# Patient Record
Sex: MALE | Race: BLACK OR AFRICAN AMERICAN | NOT HISPANIC OR LATINO | Employment: PART TIME | URBAN - METROPOLITAN AREA
[De-identification: names, ages, dates, MRNs, and addresses within clinical notes are randomized per-mention and may not be internally consistent; named-entity substitution may affect disease eponyms.]

---

## 2019-10-28 ENCOUNTER — OFFICE VISIT - HEALTHEAST (OUTPATIENT)
Dept: FAMILY MEDICINE | Facility: CLINIC | Age: 44
End: 2019-10-28

## 2019-10-28 DIAGNOSIS — E11.9 TYPE 2 DIABETES MELLITUS WITHOUT COMPLICATION, WITH LONG-TERM CURRENT USE OF INSULIN (H): ICD-10-CM

## 2019-10-28 DIAGNOSIS — Z79.4 TYPE 2 DIABETES MELLITUS WITHOUT COMPLICATION, WITH LONG-TERM CURRENT USE OF INSULIN (H): ICD-10-CM

## 2019-10-28 ASSESSMENT — MIFFLIN-ST. JEOR: SCORE: 1864.99

## 2019-10-31 ENCOUNTER — COMMUNICATION - HEALTHEAST (OUTPATIENT)
Dept: FAMILY MEDICINE | Facility: CLINIC | Age: 44
End: 2019-10-31

## 2020-01-20 ENCOUNTER — COMMUNICATION - HEALTHEAST (OUTPATIENT)
Dept: FAMILY MEDICINE | Facility: CLINIC | Age: 45
End: 2020-01-20

## 2020-01-20 ENCOUNTER — OFFICE VISIT - HEALTHEAST (OUTPATIENT)
Dept: FAMILY MEDICINE | Facility: CLINIC | Age: 45
End: 2020-01-20

## 2020-01-20 DIAGNOSIS — E11.9 TYPE 2 DIABETES MELLITUS WITHOUT COMPLICATION, WITH LONG-TERM CURRENT USE OF INSULIN (H): ICD-10-CM

## 2020-01-20 DIAGNOSIS — Z23 NEED FOR TDAP VACCINATION: ICD-10-CM

## 2020-01-20 DIAGNOSIS — Z23 NEED FOR INFLUENZA VACCINATION: ICD-10-CM

## 2020-01-20 DIAGNOSIS — Z79.4 TYPE 2 DIABETES MELLITUS WITHOUT COMPLICATION, WITH LONG-TERM CURRENT USE OF INSULIN (H): ICD-10-CM

## 2020-01-20 LAB
ANION GAP SERPL CALCULATED.3IONS-SCNC: 10 MMOL/L (ref 5–18)
BUN SERPL-MCNC: 11 MG/DL (ref 8–22)
CALCIUM SERPL-MCNC: 9.5 MG/DL (ref 8.5–10.5)
CHLORIDE BLD-SCNC: 103 MMOL/L (ref 98–107)
CHOLEST SERPL-MCNC: 74 MG/DL
CO2 SERPL-SCNC: 26 MMOL/L (ref 22–31)
CREAT SERPL-MCNC: 1.05 MG/DL (ref 0.7–1.3)
CREAT UR-MCNC: 93.5 MG/DL
FASTING STATUS PATIENT QL REPORTED: NO
GFR SERPL CREATININE-BSD FRML MDRD: >60 ML/MIN/1.73M2
GLUCOSE BLD-MCNC: 333 MG/DL (ref 70–125)
HBA1C MFR BLD: 10.3 % (ref 3.5–6)
HDLC SERPL-MCNC: 35 MG/DL
LDLC SERPL CALC-MCNC: 23 MG/DL
MICROALBUMIN UR-MCNC: 1.7 MG/DL (ref 0–1.99)
MICROALBUMIN/CREAT UR: 18.2 MG/G
POTASSIUM BLD-SCNC: 4.2 MMOL/L (ref 3.5–5)
SODIUM SERPL-SCNC: 139 MMOL/L (ref 136–145)
TRIGL SERPL-MCNC: 82 MG/DL

## 2020-05-11 ENCOUNTER — OFFICE VISIT - HEALTHEAST (OUTPATIENT)
Dept: FAMILY MEDICINE | Facility: CLINIC | Age: 45
End: 2020-05-11

## 2020-05-11 DIAGNOSIS — Z79.4 TYPE 2 DIABETES MELLITUS WITHOUT COMPLICATION, WITH LONG-TERM CURRENT USE OF INSULIN (H): ICD-10-CM

## 2020-05-11 DIAGNOSIS — E11.9 TYPE 2 DIABETES MELLITUS WITHOUT COMPLICATION, WITH LONG-TERM CURRENT USE OF INSULIN (H): ICD-10-CM

## 2020-05-18 ENCOUNTER — AMBULATORY - HEALTHEAST (OUTPATIENT)
Dept: LAB | Facility: CLINIC | Age: 45
End: 2020-05-18

## 2020-05-18 DIAGNOSIS — E11.9 TYPE 2 DIABETES MELLITUS WITHOUT COMPLICATION, WITH LONG-TERM CURRENT USE OF INSULIN (H): ICD-10-CM

## 2020-05-18 DIAGNOSIS — Z79.4 TYPE 2 DIABETES MELLITUS WITHOUT COMPLICATION, WITH LONG-TERM CURRENT USE OF INSULIN (H): ICD-10-CM

## 2020-05-18 LAB — HBA1C MFR BLD: 10.3 % (ref 3.5–6)

## 2020-05-27 ENCOUNTER — COMMUNICATION - HEALTHEAST (OUTPATIENT)
Dept: FAMILY MEDICINE | Facility: CLINIC | Age: 45
End: 2020-05-27

## 2020-05-27 DIAGNOSIS — E11.9 TYPE 2 DIABETES MELLITUS WITHOUT COMPLICATION, WITH LONG-TERM CURRENT USE OF INSULIN (H): ICD-10-CM

## 2020-05-27 DIAGNOSIS — Z79.4 TYPE 2 DIABETES MELLITUS WITHOUT COMPLICATION, WITH LONG-TERM CURRENT USE OF INSULIN (H): ICD-10-CM

## 2020-06-01 ENCOUNTER — RECORDS - HEALTHEAST (OUTPATIENT)
Dept: ADMINISTRATIVE | Facility: OTHER | Age: 45
End: 2020-06-01

## 2020-06-11 ENCOUNTER — AMBULATORY - HEALTHEAST (OUTPATIENT)
Dept: FAMILY MEDICINE | Facility: CLINIC | Age: 45
End: 2020-06-11

## 2020-06-11 DIAGNOSIS — Z79.4 TYPE 2 DIABETES MELLITUS WITHOUT COMPLICATION, WITH LONG-TERM CURRENT USE OF INSULIN (H): ICD-10-CM

## 2020-06-11 DIAGNOSIS — E11.9 TYPE 2 DIABETES MELLITUS WITHOUT COMPLICATION, WITH LONG-TERM CURRENT USE OF INSULIN (H): ICD-10-CM

## 2020-06-22 ENCOUNTER — HOSPITAL ENCOUNTER (OUTPATIENT)
Dept: PHARMACY | Facility: OTHER | Age: 45
Discharge: HOME | End: 2020-06-22
Attending: PHARMACIST

## 2020-06-22 DIAGNOSIS — E11.65 TYPE 2 DIABETES MELLITUS WITH HYPERGLYCEMIA, WITH LONG-TERM CURRENT USE OF INSULIN (H): ICD-10-CM

## 2020-06-22 DIAGNOSIS — E11.9 TYPE 2 DIABETES MELLITUS WITHOUT COMPLICATION, WITH LONG-TERM CURRENT USE OF INSULIN (H): ICD-10-CM

## 2020-06-22 DIAGNOSIS — I10 ESSENTIAL HYPERTENSION: ICD-10-CM

## 2020-06-22 DIAGNOSIS — Z79.4 TYPE 2 DIABETES MELLITUS WITH HYPERGLYCEMIA, WITH LONG-TERM CURRENT USE OF INSULIN (H): ICD-10-CM

## 2020-06-22 DIAGNOSIS — M25.519 ARTHRALGIA OF SHOULDER, UNSPECIFIED LATERALITY: ICD-10-CM

## 2020-06-22 DIAGNOSIS — Z79.4 TYPE 2 DIABETES MELLITUS WITHOUT COMPLICATION, WITH LONG-TERM CURRENT USE OF INSULIN (H): ICD-10-CM

## 2020-06-22 PROCEDURE — 99607 MTMS BY PHARM ADDL 15 MIN: CPT | Performed by: PHARMACIST

## 2020-06-22 PROCEDURE — 99605 MTMS BY PHARM NP 15 MIN: CPT | Performed by: PHARMACIST

## 2020-06-22 RX ORDER — IBUPROFEN 200 MG
200 TABLET ORAL EVERY 6 HOURS PRN
Status: SHIPPED | COMMUNITY
Start: 2020-06-22 | End: 2021-09-24

## 2020-06-22 RX ORDER — FLASH GLUCOSE SENSOR
1 KIT MISCELLANEOUS
Qty: 2 KIT | Refills: 11 | Status: SHIPPED | OUTPATIENT
Start: 2020-06-22 | End: 2021-09-24

## 2020-06-29 ENCOUNTER — HOSPITAL ENCOUNTER (OUTPATIENT)
Dept: PHARMACY | Facility: OTHER | Age: 45
Discharge: HOME | End: 2020-06-29
Attending: PHARMACIST

## 2020-06-29 DIAGNOSIS — Z79.4 TYPE 2 DIABETES MELLITUS WITH HYPERGLYCEMIA, WITH LONG-TERM CURRENT USE OF INSULIN (H): ICD-10-CM

## 2020-06-29 DIAGNOSIS — E11.65 TYPE 2 DIABETES MELLITUS WITH HYPERGLYCEMIA, WITH LONG-TERM CURRENT USE OF INSULIN (H): ICD-10-CM

## 2020-07-06 ENCOUNTER — HOSPITAL ENCOUNTER (OUTPATIENT)
Dept: PHARMACY | Facility: OTHER | Age: 45
Discharge: HOME | End: 2020-07-06
Attending: PHARMACIST

## 2020-07-06 DIAGNOSIS — Z79.4 TYPE 2 DIABETES MELLITUS WITH HYPERGLYCEMIA, WITH LONG-TERM CURRENT USE OF INSULIN (H): ICD-10-CM

## 2020-07-06 DIAGNOSIS — E11.65 TYPE 2 DIABETES MELLITUS WITH HYPERGLYCEMIA, WITH LONG-TERM CURRENT USE OF INSULIN (H): ICD-10-CM

## 2020-07-15 ENCOUNTER — COMMUNICATION - HEALTHEAST (OUTPATIENT)
Dept: FAMILY MEDICINE | Facility: CLINIC | Age: 45
End: 2020-07-15

## 2020-07-15 DIAGNOSIS — E11.65 TYPE 2 DIABETES MELLITUS WITH HYPERGLYCEMIA, WITH LONG-TERM CURRENT USE OF INSULIN (H): ICD-10-CM

## 2020-07-15 DIAGNOSIS — Z79.4 TYPE 2 DIABETES MELLITUS WITH HYPERGLYCEMIA, WITH LONG-TERM CURRENT USE OF INSULIN (H): ICD-10-CM

## 2020-07-20 ENCOUNTER — HOSPITAL ENCOUNTER (OUTPATIENT)
Dept: PHARMACY | Facility: OTHER | Age: 45
Discharge: HOME | End: 2020-07-20
Attending: PHARMACIST

## 2020-07-20 DIAGNOSIS — E11.65 TYPE 2 DIABETES MELLITUS WITH HYPERGLYCEMIA, WITH LONG-TERM CURRENT USE OF INSULIN (H): ICD-10-CM

## 2020-07-20 DIAGNOSIS — Z79.4 TYPE 2 DIABETES MELLITUS WITH HYPERGLYCEMIA, WITH LONG-TERM CURRENT USE OF INSULIN (H): ICD-10-CM

## 2020-07-20 DIAGNOSIS — M25.519 ARTHRALGIA OF SHOULDER, UNSPECIFIED LATERALITY: ICD-10-CM

## 2020-07-20 DIAGNOSIS — I10 ESSENTIAL HYPERTENSION: ICD-10-CM

## 2020-07-20 PROCEDURE — 99607 MTMS BY PHARM ADDL 15 MIN: CPT | Performed by: PHARMACIST

## 2020-07-20 PROCEDURE — 99606 MTMS BY PHARM EST 15 MIN: CPT | Performed by: PHARMACIST

## 2020-08-04 ENCOUNTER — COMMUNICATION - HEALTHEAST (OUTPATIENT)
Dept: FAMILY MEDICINE | Facility: CLINIC | Age: 45
End: 2020-08-04

## 2020-08-04 DIAGNOSIS — E11.9 TYPE 2 DIABETES MELLITUS WITHOUT COMPLICATION, WITH LONG-TERM CURRENT USE OF INSULIN (H): ICD-10-CM

## 2020-08-04 DIAGNOSIS — Z79.4 TYPE 2 DIABETES MELLITUS WITHOUT COMPLICATION, WITH LONG-TERM CURRENT USE OF INSULIN (H): ICD-10-CM

## 2020-08-14 ENCOUNTER — COMMUNICATION - HEALTHEAST (OUTPATIENT)
Dept: FAMILY MEDICINE | Facility: CLINIC | Age: 45
End: 2020-08-14

## 2020-08-14 DIAGNOSIS — Z79.4 TYPE 2 DIABETES MELLITUS WITH HYPERGLYCEMIA, WITH LONG-TERM CURRENT USE OF INSULIN (H): ICD-10-CM

## 2020-08-14 DIAGNOSIS — E11.65 TYPE 2 DIABETES MELLITUS WITH HYPERGLYCEMIA, WITH LONG-TERM CURRENT USE OF INSULIN (H): ICD-10-CM

## 2020-08-14 RX ORDER — FLASH GLUCOSE SCANNING READER
EACH MISCELLANEOUS
Qty: 1 EACH | Refills: 0 | Status: SHIPPED | OUTPATIENT
Start: 2020-08-14 | End: 2021-09-24

## 2020-08-17 ENCOUNTER — HOSPITAL ENCOUNTER (OUTPATIENT)
Dept: PHARMACY | Facility: OTHER | Age: 45
Discharge: HOME | End: 2020-08-17
Attending: PHARMACIST

## 2020-08-17 DIAGNOSIS — E11.65 TYPE 2 DIABETES MELLITUS WITH HYPERGLYCEMIA, WITH LONG-TERM CURRENT USE OF INSULIN (H): ICD-10-CM

## 2020-08-17 DIAGNOSIS — Z79.4 TYPE 2 DIABETES MELLITUS WITH HYPERGLYCEMIA, WITH LONG-TERM CURRENT USE OF INSULIN (H): ICD-10-CM

## 2020-08-19 ENCOUNTER — OFFICE VISIT - HEALTHEAST (OUTPATIENT)
Dept: PHARMACY | Facility: CLINIC | Age: 45
End: 2020-08-19

## 2020-08-19 ENCOUNTER — COMMUNICATION - HEALTHEAST (OUTPATIENT)
Dept: FAMILY MEDICINE | Facility: CLINIC | Age: 45
End: 2020-08-19

## 2020-08-19 DIAGNOSIS — Z79.4 TYPE 2 DIABETES MELLITUS WITHOUT COMPLICATION, WITH LONG-TERM CURRENT USE OF INSULIN (H): ICD-10-CM

## 2020-08-19 DIAGNOSIS — E11.9 TYPE 2 DIABETES MELLITUS WITHOUT COMPLICATION, WITH LONG-TERM CURRENT USE OF INSULIN (H): ICD-10-CM

## 2020-08-19 DIAGNOSIS — Z79.4 TYPE 2 DIABETES MELLITUS WITH HYPERGLYCEMIA, WITH LONG-TERM CURRENT USE OF INSULIN (H): ICD-10-CM

## 2020-08-19 DIAGNOSIS — E11.65 TYPE 2 DIABETES MELLITUS WITH HYPERGLYCEMIA, WITH LONG-TERM CURRENT USE OF INSULIN (H): ICD-10-CM

## 2020-09-02 ENCOUNTER — AMBULATORY - HEALTHEAST (OUTPATIENT)
Dept: PHARMACY | Facility: CLINIC | Age: 45
End: 2020-09-02

## 2020-09-02 ENCOUNTER — COMMUNICATION - HEALTHEAST (OUTPATIENT)
Dept: FAMILY MEDICINE | Facility: CLINIC | Age: 45
End: 2020-09-02

## 2020-09-02 DIAGNOSIS — Z79.4 TYPE 2 DIABETES MELLITUS WITH HYPERGLYCEMIA, WITH LONG-TERM CURRENT USE OF INSULIN (H): ICD-10-CM

## 2020-09-02 DIAGNOSIS — E11.65 TYPE 2 DIABETES MELLITUS WITH HYPERGLYCEMIA, WITH LONG-TERM CURRENT USE OF INSULIN (H): ICD-10-CM

## 2020-09-02 PROCEDURE — 99606 MTMS BY PHARM EST 15 MIN: CPT | Performed by: PHARMACIST

## 2020-09-02 PROCEDURE — 99607 MTMS BY PHARM ADDL 15 MIN: CPT | Performed by: PHARMACIST

## 2020-09-02 RX ORDER — EXENATIDE 2 MG/.65ML
2 INJECTION, SUSPENSION, EXTENDED RELEASE SUBCUTANEOUS
Qty: 4 EACH | Refills: 3 | Status: SHIPPED | OUTPATIENT
Start: 2020-09-02 | End: 2021-09-24

## 2020-09-08 ENCOUNTER — COMMUNICATION - HEALTHEAST (OUTPATIENT)
Dept: FAMILY MEDICINE | Facility: CLINIC | Age: 45
End: 2020-09-08

## 2020-09-08 DIAGNOSIS — E11.9 TYPE 2 DIABETES MELLITUS WITHOUT COMPLICATION, WITH LONG-TERM CURRENT USE OF INSULIN (H): ICD-10-CM

## 2020-09-08 DIAGNOSIS — Z79.4 TYPE 2 DIABETES MELLITUS WITHOUT COMPLICATION, WITH LONG-TERM CURRENT USE OF INSULIN (H): ICD-10-CM

## 2020-09-14 ENCOUNTER — COMMUNICATION - HEALTHEAST (OUTPATIENT)
Dept: FAMILY MEDICINE | Facility: CLINIC | Age: 45
End: 2020-09-14

## 2020-09-14 DIAGNOSIS — E11.9 TYPE 2 DIABETES MELLITUS WITHOUT COMPLICATION, WITH LONG-TERM CURRENT USE OF INSULIN (H): ICD-10-CM

## 2020-09-14 DIAGNOSIS — Z79.4 TYPE 2 DIABETES MELLITUS WITHOUT COMPLICATION, WITH LONG-TERM CURRENT USE OF INSULIN (H): ICD-10-CM

## 2020-09-15 ENCOUNTER — COMMUNICATION - HEALTHEAST (OUTPATIENT)
Dept: FAMILY MEDICINE | Facility: CLINIC | Age: 45
End: 2020-09-15

## 2020-10-21 ENCOUNTER — COMMUNICATION - HEALTHEAST (OUTPATIENT)
Dept: FAMILY MEDICINE | Facility: CLINIC | Age: 45
End: 2020-10-21

## 2020-10-21 DIAGNOSIS — E11.9 TYPE 2 DIABETES MELLITUS WITHOUT COMPLICATION, WITH LONG-TERM CURRENT USE OF INSULIN (H): ICD-10-CM

## 2020-10-21 DIAGNOSIS — Z79.4 TYPE 2 DIABETES MELLITUS WITHOUT COMPLICATION, WITH LONG-TERM CURRENT USE OF INSULIN (H): ICD-10-CM

## 2020-11-12 ENCOUNTER — COMMUNICATION - HEALTHEAST (OUTPATIENT)
Dept: FAMILY MEDICINE | Facility: CLINIC | Age: 45
End: 2020-11-12

## 2020-11-12 DIAGNOSIS — Z79.4 TYPE 2 DIABETES MELLITUS WITHOUT COMPLICATION, WITH LONG-TERM CURRENT USE OF INSULIN (H): ICD-10-CM

## 2020-11-12 DIAGNOSIS — E11.9 TYPE 2 DIABETES MELLITUS WITHOUT COMPLICATION, WITH LONG-TERM CURRENT USE OF INSULIN (H): ICD-10-CM

## 2021-01-15 ENCOUNTER — COMMUNICATION - HEALTHEAST (OUTPATIENT)
Dept: FAMILY MEDICINE | Facility: CLINIC | Age: 46
End: 2021-01-15

## 2021-05-12 ENCOUNTER — OFFICE VISIT - HEALTHEAST (OUTPATIENT)
Dept: FAMILY MEDICINE | Facility: CLINIC | Age: 46
End: 2021-05-12

## 2021-05-12 DIAGNOSIS — E11.9 TYPE 2 DIABETES MELLITUS WITHOUT COMPLICATION, WITH LONG-TERM CURRENT USE OF INSULIN (H): ICD-10-CM

## 2021-05-12 DIAGNOSIS — Z79.4 TYPE 2 DIABETES MELLITUS WITHOUT COMPLICATION, WITH LONG-TERM CURRENT USE OF INSULIN (H): ICD-10-CM

## 2021-05-12 DIAGNOSIS — Z79.4 TYPE 2 DIABETES MELLITUS WITH HYPERGLYCEMIA, WITH LONG-TERM CURRENT USE OF INSULIN (H): ICD-10-CM

## 2021-05-12 DIAGNOSIS — E11.65 TYPE 2 DIABETES MELLITUS WITH HYPERGLYCEMIA, WITH LONG-TERM CURRENT USE OF INSULIN (H): ICD-10-CM

## 2021-05-12 LAB
ANION GAP SERPL CALCULATED.3IONS-SCNC: 13 MMOL/L (ref 5–18)
BUN SERPL-MCNC: 17 MG/DL (ref 8–22)
CALCIUM SERPL-MCNC: 8.9 MG/DL (ref 8.5–10.5)
CHLORIDE BLD-SCNC: 104 MMOL/L (ref 98–107)
CHOLEST SERPL-MCNC: 93 MG/DL
CO2 SERPL-SCNC: 22 MMOL/L (ref 22–31)
CREAT SERPL-MCNC: 1.02 MG/DL (ref 0.7–1.3)
CREAT UR-MCNC: 144.6 MG/DL
FASTING STATUS PATIENT QL REPORTED: ABNORMAL
GFR SERPL CREATININE-BSD FRML MDRD: >60 ML/MIN/1.73M2
GLUCOSE BLD-MCNC: 367 MG/DL (ref 70–125)
HBA1C MFR BLD: 8.8 %
HDLC SERPL-MCNC: 36 MG/DL
LDLC SERPL CALC-MCNC: 29 MG/DL
MICROALBUMIN UR-MCNC: 1.39 MG/DL (ref 0–1.99)
MICROALBUMIN/CREAT UR: 9.6 MG/G
POTASSIUM BLD-SCNC: 4 MMOL/L (ref 3.5–5)
SODIUM SERPL-SCNC: 139 MMOL/L (ref 136–145)
TRIGL SERPL-MCNC: 139 MG/DL

## 2021-05-12 RX ORDER — ATORVASTATIN CALCIUM 40 MG/1
40 TABLET, FILM COATED ORAL DAILY
Qty: 90 TABLET | Refills: 3 | Status: SHIPPED | OUTPATIENT
Start: 2021-05-12 | End: 2021-09-24

## 2021-05-12 RX ORDER — LISINOPRIL 5 MG/1
5 TABLET ORAL DAILY
Qty: 90 TABLET | Refills: 1 | Status: SHIPPED | OUTPATIENT
Start: 2021-05-12 | End: 2021-09-24

## 2021-05-12 ASSESSMENT — MIFFLIN-ST. JEOR: SCORE: 1884.83

## 2021-05-13 LAB — HCV AB SERPL QL IA: NEGATIVE

## 2021-05-17 ENCOUNTER — RECORDS - HEALTHEAST (OUTPATIENT)
Dept: ADMINISTRATIVE | Facility: OTHER | Age: 46
End: 2021-05-17

## 2021-05-17 LAB — RETINOPATHY: NEGATIVE

## 2021-05-18 ENCOUNTER — COMMUNICATION - HEALTHEAST (OUTPATIENT)
Dept: FAMILY MEDICINE | Facility: CLINIC | Age: 46
End: 2021-05-18

## 2021-05-18 DIAGNOSIS — Z79.4 TYPE 2 DIABETES MELLITUS WITHOUT COMPLICATION, WITH LONG-TERM CURRENT USE OF INSULIN (H): ICD-10-CM

## 2021-05-18 DIAGNOSIS — E11.65 TYPE 2 DIABETES MELLITUS WITH HYPERGLYCEMIA, WITH LONG-TERM CURRENT USE OF INSULIN (H): ICD-10-CM

## 2021-05-18 DIAGNOSIS — Z79.4 TYPE 2 DIABETES MELLITUS WITH HYPERGLYCEMIA, WITH LONG-TERM CURRENT USE OF INSULIN (H): ICD-10-CM

## 2021-05-18 DIAGNOSIS — E11.9 TYPE 2 DIABETES MELLITUS WITHOUT COMPLICATION, WITH LONG-TERM CURRENT USE OF INSULIN (H): ICD-10-CM

## 2021-05-21 ENCOUNTER — RECORDS - HEALTHEAST (OUTPATIENT)
Dept: HEALTH INFORMATION MANAGEMENT | Facility: CLINIC | Age: 46
End: 2021-05-21

## 2021-05-27 VITALS
DIASTOLIC BLOOD PRESSURE: 78 MMHG | SYSTOLIC BLOOD PRESSURE: 118 MMHG | HEART RATE: 101 BPM | HEIGHT: 72 IN | WEIGHT: 216 LBS | BODY MASS INDEX: 29.26 KG/M2

## 2021-06-02 NOTE — TELEPHONE ENCOUNTER
I spoke with Valentin on October 31, 2019, he is requesting to use the Pharmacy Assistance Fund $500.00..    Unfortunately Valentin is not eligible for this funding, he is not an established patient with [facility], and does not utilize a [facility] on a regular basis.    Amie Alston  [facility]   Pharmacy Assistance  [phone]

## 2021-06-02 NOTE — PROGRESS NOTES
"Subjective:  44 y.o. male with concerns of follow up on DM-II  He has lived in [place] and gone back and forth.  Cannot find work in [place] so came back even though he doesn't like the winter here.    [facility] 10/18/19.  Notes reviewed.  Glucose 341.  They have him Humalog 75/25 to add to the basal insulin he had already (Lantuss--he was using 50, they cut that to 20).  I think they were meaning that he should get plain Humalog.    He thinks sugars are 300+.  Denies excessive thirst or urination.    Outpatient Medications Prior to Visit   Medication Sig Dispense Refill     BD ULTRA-FINE TRISH PEN NEEDLES 32 gauge x 5/32\" Ndle USE TO INJECT NOVOLOG INSULIN USING FLEX PEN AND LANTUS SOLOSTAR 100 each 0     MICROLET LANCET Misc        CONTOUR NEXT STRIPS strips USE TO CHECK BLOOD GLUCOSE FOUR TIMES DAILY. BEFORE EACH MEAL AND AT BEDTIME 125 strip 0     insulin glargine (LANTUS) 100 unit/mL vial Inject 20 Units under the skin. Inject 20 Units under the skin.       insulin lispro protamine-insulin lispro (HUMALOG MIX 75-25,U-100,INSULN) 100 unit/mL (75-25) Susp Inject 10 Units under the skin. Inject 10 Units under the skin sliding scale.       blood sugar diagnostic (ONE TOUCH ULTRA TEST) Strp Test 4 times daily 360 strip 3     blood-glucose meter (ONE TOUCH ULTRA SYSTEM KIT) kit Use as instructed 1 each 0     LANTUS SOLOSTAR 100 unit/mL (3 mL) pen INJECT 30 UNITS UNDER THE SKIN DAILY AT BEDTIME 15 mL 0     NOVOLOG FLEXPEN 100 unit/mL injection pen INJECT 7 UNITS UNDER THE SKIN THREE TIMES DAILY BEFORE MEALS 15 mL 0     NOVOLOG FLEXPEN 100 unit/mL injection pen INJECT 7 UNITS UNDER THE SKIN THREE TIMES DAILY BEFORE MEALS 15 mL 0     No facility-administered medications prior to visit.       Social History     Tobacco Use   Smoking Status Never Smoker   Smokeless Tobacco Never Used      Objective:  /80 (Patient Site: Right Arm, Patient Position: Sitting, Cuff Size: Adult Regular)   Pulse 100   Ht 5' 10.25\" (1.784 m)   Wt " 216 lb (98 kg)   BMI 30.77 kg/m    GENERAL: alert, not distressed  CHEST: clear, no rales, rhonchi, or wheezes  CARDIAC: regular without murmur, gallop, or rub  ABDOMEN: soft, non tender, non distended, normal bowel sounds    Assessment and Plan:     1. Type 2 diabetes mellitus without complication, with long-term current use of insulin (H)    No insurance now.  Gave card for [employer] assistance and encouraged him to call.    Printed this scale for use with [employer] Novolog.  New Rx sent.    Sliding scale insulin:  Start with 10 units with meals.    For blood glucose  Add  Less than 150   0  150-200   2  210-250   4  251-300   6  301-350   8  351-400   10  401-450   13   Greater than 450  16    Prescription for test strips, Novolog, lisinopril, ASA, atorvastatin.  Advised not to use vial of 75/25.    Follow up about 6-8 weeks or when insurance is active.

## 2021-06-02 NOTE — PATIENT INSTRUCTIONS - HE
Sliding scale insulin:  Start with 10 units with meals.    For blood glucose  Add  Less than 150   0  150-200   2  210-250   4  251-300   6  301-350   8  351-400   10  401-450   13   Greater than 450  16

## 2021-06-03 VITALS
SYSTOLIC BLOOD PRESSURE: 116 MMHG | DIASTOLIC BLOOD PRESSURE: 80 MMHG | WEIGHT: 216 LBS | BODY MASS INDEX: 30.92 KG/M2 | HEART RATE: 100 BPM | HEIGHT: 70 IN

## 2021-06-04 VITALS
HEART RATE: 80 BPM | WEIGHT: 214 LBS | BODY MASS INDEX: 30.49 KG/M2 | DIASTOLIC BLOOD PRESSURE: 78 MMHG | SYSTOLIC BLOOD PRESSURE: 108 MMHG

## 2021-06-05 NOTE — PROGRESS NOTES
"Subjective:  44 y.o. male with concerns of follow-up on diabetes.  Think sugars have probably been high.  Getting 150-210 fasting.  Numbers in 300s after meals.  Using 30 units of Lantus nightly and 10 units plus a correction at mealtimes.  Does note increased thirst and urination if sugar is quite high.    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  Not much for exercise.    Outpatient Medications Prior to Visit   Medication Sig Dispense Refill     aspirin 81 MG EC tablet Take 1 tablet (81 mg total) by mouth daily. 150 tablet 2     atorvastatin (LIPITOR) 40 MG tablet Take 1 tablet (40 mg total) by mouth daily. 90 tablet 3     blood glucose test strips Use one test strip with your Next meter to check blood sugar 3 time(s) daily 300 strip 3     lisinopril (PRINIVIL,ZESTRIL) 5 MG tablet Take 1 tablet (5 mg total) by mouth daily. 90 tablet 1     MICROLET LANCET Misc        BD ULTRA-FINE TRISH PEN NEEDLES 32 gauge x 5/32\" Ndle USE TO INJECT NOVOLOG INSULIN USING FLEX PEN AND LANTUS SOLOSTAR 100 each 0     insulin aspart U-100 (NOVOLOG FLEXPEN U-100 INSULIN) 100 unit/mL (3 mL) injection pen Inject 10 Units under the skin 3 (three) times a day before meals. 15 mL 4     insulin glargine (LANTUS SOLOSTAR U-100 INSULIN) 100 unit/mL (3 mL) pen Inject 30 Units under the skin at bedtime. 15 mL 0     No facility-administered medications prior to visit.       Social History     Tobacco Use   Smoking Status Never Smoker   Smokeless Tobacco Never Used      Objective:  /78 (Patient Site: Right Arm, Patient Position: Sitting, Cuff Size: Adult Regular)   Pulse 80   Wt 214 lb (97.1 kg) Comment: with shoes/jacket  BMI 30.49 kg/m    GENERAL: alert, not distressed  CHEST: clear, no rales, rhonchi, or wheezes  CARDIAC: regular without murmur, gallop, or rub  ABDOMEN: soft, non tender, non distended, normal bowel sounds    FOOT EXAM:  DP and PT pulses are normal.  Monofilament testing normal  Nails " "normal.  Hair growth absent.    Recent Results (from the past 24 hour(s))   Glycosylated Hemoglobin A1c   Result Value Ref Range    Hemoglobin A1c 10.3 (H) 3.5 - 6.0 %      Assessment and Plan:   1. Type 2 diabetes mellitus without complication, with long-term current use of insulin (H)  Not controlled.  BP is ok.  Needs better control   Increase to 40 lantus  Increase to 15 novolog with meals plus correction.  Discussed hypoglycemia symptoms and treatment  - Lipid Cascade FASTING  - Glycosylated Hemoglobin A1c  - Basic Metabolic Panel  - Microalbumin, Random Urine  - Ambulatory referral to Ophthalmology  -  DIABETES FOOT EXAM  - insulin glargine (LANTUS SOLOSTAR U-100 INSULIN) 100 unit/mL (3 mL) pen; Inject 40 Units under the skin at bedtime.  Dispense: 5 adj dose pen; Refill: 5  - insulin aspart U-100 (NOVOLOG FLEXPEN U-100 INSULIN) 100 unit/mL (3 mL) injection pen; Inject 15 Units under the skin 3 (three) times a day before meals.  Dispense: 5 Pre-filled Pen Syringe; Refill: 5  - pen needle, diabetic (BD ULTRA-FINE TRISH PEN NEEDLE) 32 gauge x 5/32\" Ndle; Use with lantus and novolog insulin.  Dispense: 100 each; Refill: 5    2. Need for Tdap vaccination  - Tdap vaccine,  8yo or older,  IM    3. Need for influenza vaccination  - Influenza, Seasonal Quad, PF =/> 6months     No follow-ups on file.   "

## 2021-06-08 NOTE — PROGRESS NOTES
"Valentin Cartagena is a 44 y.o. male who is being evaluated via a billable video visit.      The patient has been notified of following:     \"This video visit will be conducted via a call between you and your physician/provider. We have found that certain health care needs can be provided without the need for an in-person physical exam.  This service lets us provide the care you need with a video conversation.  If a prescription is necessary we can send it directly to your pharmacy.  If lab work is needed we can place an order for that and you can then stop by our lab to have the test done at a later time.    Video visits are billed at different rates depending on your insurance coverage. Please reach out to your insurance provider with any questions.    If during the course of the call the physician/provider feels a video visit is not appropriate, you will not be charged for this service.\"    Patient has given verbal consent to a Video visit? Yes    Patient would like to receive their AVS by AVS Preference: Mail a copy.    Patient would like the video invitation sent by: Text to cell phone: [phone]    Will anyone else be joining your video visit? No        Video Start Time: 0920    Additional provider notes:   44 y.o. man seen for diabetes follow-up.  Reports sugars averaging around 250 when he checks it.  Denies excessive thirst or urination.  Reports using Lantus 45 units per night.  NovoLog 15 units with correction factor with mealtimes.  He has been keeping up with prescriptions.    He is working as a .    Denies headaches or chills.    Exam:  Alert not distressed  Fluent  Breathing unlabored, no cough  Normal affect.  Ambulatory     1. Type 2 diabetes mellitus without complication, with long-term current use of insulin (H)  Previously not controlled  Better adherence to meds.  Will have him come in for A1c.  Adjust meds based on results.    - Glycosylated Hemoglobin A1c; Future "       Video-Visit Details    Type of service:  Video Visit    Video End Time (time video stopped): 0925  Originating Location (pt. Location): Home    Distant Location (provider location):  [facility]     Platform used for Video Visit: Josué Bob MD

## 2021-06-09 NOTE — PROGRESS NOTES
MTM Consult Encounter    Valentin Cartagena is a 45 y.o. male referred for a clinical pharmacist consult from PharmD for CGM device teaching.    Discussion: Pt was educated on how to apply sensor. Was able to apply sensor to his arm.     Was unable to walk patient through how to use his phone as a scanner.   Walked pt through using the CGM scanner to active sensor and scan sensor.     Has been using Insulin as prescribed and Metformin 500 mg two times a day. 180-200 fasting. Will have pt continue metformin titration.       Plan:  1. Scan sensor four times a day   2. Increase Metformin 500 mg to 2 tabs AM and 1 tab x 3 days, then 2 tabs two times a day.       Follow up:   2 weeks with PharmD over the phone.       José Antonio Iqbal, PharmD  Medication Therapy Management (MTM) Pharmacist  [facility]          Current Outpatient Medications   Medication Sig Dispense Refill     aspirin 81 MG EC tablet Take 1 tablet (81 mg total) by mouth daily. 150 tablet 2     atorvastatin (LIPITOR) 40 MG tablet Take 1 tablet (40 mg total) by mouth daily. 90 tablet 3     blood glucose test strips Use one test strip with your Next meter to check blood sugar 3 time(s) daily 300 strip 3     flash glucose scanning reader (FREESTYLE WALKER 14 DAY READER) Misc Use 1 application As Directed every 8 (eight) hours. 1 each 0     flash glucose sensor (FREESTYLE WALKER 14 DAY SENSOR) Kit Use 1 application As Directed every 14 (fourteen) days. 2 kit 11     ibuprofen (ADVIL,MOTRIN) 200 MG tablet Take 200 mg by mouth every 6 (six) hours as needed for pain.       insulin aspart U-100 (NOVOLOG FLEXPEN U-100 INSULIN) 100 unit/mL (3 mL) injection pen Inject 20 Units under the skin 3 (three) times a day before meals.  0     insulin glargine (LANTUS SOLOSTAR U-100 INSULIN) 100 unit/mL (3 mL) pen Inject 45 Units under the skin at bedtime.  0     lisinopriL (PRINIVIL,ZESTRIL) 5 MG tablet TAKE 1 TABLET BY MOUTH EVERY DAY 90 tablet 1     metFORMIN  "(GLUCOPHAGE) 500 MG tablet Take 1 tablet (500 mg total) by mouth daily with breakfast for 3 days, THEN 1 tablet (500 mg total) 2 (two) times a day with meals. 60 tablet 1     MICROLET LANCET Misc        pen needle, diabetic (BD ULTRA-FINE TRISH PEN NEEDLE) 32 gauge x 5/32\" Ndle Use with lantus and novolog insulin. 100 each 5     No current facility-administered medications for this encounter.                         "

## 2021-06-09 NOTE — PROGRESS NOTES
MTM Follow-Up Encounter  Assessment & Plan                                                       Diabetes: Needs improvement-A1c above goal less than 8% (MNCM).  Fasting sugar is above goal . Reviewed prescribed dose of metformin, will have pt increase as previously prescribed. Instructed pt to call  number on CGM sensor box, they may be able to supply pt with a replacement sensor. Otherwise, pt likely able to get a refill through insurance. In the meantime, continue checking SMBG as pt has been.     Given blood sugars significantly above goal and desire for weight loss, would benefit from consideration of alternative therapies versus continued titration of insulin. Consider GLP-1 as the next step.   -Pt to take Metformin 500 mg 2 tabs two times a day as previously prescribed   -Refill CGM sensor   -Continue current insulin dosing       Hypertension: Stable - Last clinic BP at goal <130/80. Denies adverse effects.   -Continue current therapy      Joint Pain: Stable - Has not been using Ibuprofen. Okay to use as needed.   -Continue current therapy       Follow Up  Return in about 4 weeks (around 8/17/2020) for Medication Management Pharmacist, Via Phone.        Subjective & Objective                                                     Valentin Cartagena is a 45 y.o. male called for a follow up visit for Medication Therapy Management. He was referred to me from Deepak Bob MD     Patient consented to a telehealth visit: Yes    Chief Complaint: Diabetes Management     Medication Adherence/Access: Denies missed doses of medications.       Diabetes:  Pt currently prescribed Lantus 45 units at bedtime and Novolog 20 units three times a day with meals. At last MTM visit, was started on Metformin 500 mg and pt was to titrate to 2 tabs two times a day. Pt got confused and only using 1 tab two times a day.     patient is not currently experiencing side effects.   SMBG: CGM device - however no home computer to  "upload results.  Ranges (patient reported) :     Pt reports first sensor fell off because he scraped against something. Had it on for close to the 14 days. Tried to place 2nd sensor and the needle bent so he couldn't get it on.     This morning checked with finger poke monitor it was in the 200s.     Patient is not experiencing hypoglycemia   Recent symptoms of high blood sugar? Polyuria, polydipsia, polyphagia (\"sweet tooth\") - none of these constantly.    ACEi/ARB:  Lisinopril 5 mg daily   Statin: Prescribed Atorvastatin 40 mg daily   Aspirin: taking 81mg daily, Denies bleeding/bruising    Diet/Exercise: Interested in some weight loss. Goal would be less than 200 lbs. Tries to stay away from greasy foods. Doesn't like fast food. Is on the road a lot. Not a lot of bread, pasta, rice. Likes meat -pork chops. Drink diet soda, mostly water.     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 Lots of going up and down stairs. Doesn't get much exercise outside work.     Lab Results   Component Value Date    HGBA1C 10.3 (H) 05/18/2020    HGBA1C 10.3 (H) 01/20/2020    HGBA1C 8.7 (H) 08/19/2014     Lab Results   Component Value Date    MICROALBUR 1.70 01/20/2020    LDLCALC 23 01/20/2020    CREATININE 1.05 01/20/2020     Lab Results   Component Value Date    CHOL 74 01/20/2020     Lab Results   Component Value Date    HDL 35 (L) 01/20/2020     Lab Results   Component Value Date    LDLCALC 23 01/20/2020     Lab Results   Component Value Date    TRIG 82 01/20/2020     No components found for: CHOLHDL     The ASCVD Risk score ([place] DC Jr., et al., 2013) failed to calculate for the following reasons:    The valid total cholesterol range is 130 to 320 mg/dL         Hypertension: Prescribed Lisinopril 5 mg daily     BP Readings from Last 3 Encounters:   01/20/20 108/78   10/28/19 116/80   01/08/15 120/74        Results for orders placed or performed in visit on 01/20/20   Basic Metabolic Panel   Result Value Ref " Range    Sodium 139 136 - 145 mmol/L    Potassium 4.2 3.5 - 5.0 mmol/L    Chloride 103 98 - 107 mmol/L    CO2 26 22 - 31 mmol/L    Anion Gap, Calculation 10 5 - 18 mmol/L    Glucose 333 (H) 70 - 125 mg/dL    Calcium 9.5 8.5 - 10.5 mg/dL    BUN 11 8 - 22 mg/dL    Creatinine 1.05 0.70 - 1.30 mg/dL    GFR MDRD Af Amer >60 >60 mL/min/1.73m2    GFR MDRD Non Af Amer >60 >60 mL/min/1.73m2            PMH: reviewed in EPIC   Allergies/ADRs: reviewed in EPIC   Alcohol:   Social History     Substance and Sexual Activity   Alcohol Use Not on file       Tobacco:   Social History     Tobacco Use   Smoking Status Never Smoker   Smokeless Tobacco Never Used     Today's Vitals: There were no vitals filed for this visit.  ----------------    The patient declined an after visit summary    I spent 18 minutes with this patient today.   All changes were made via collaborative practice agreement with Deepak Bob MD . A copy of the visit note was provided to the patient's provider.     José Antonio Iqbal PharmD  Medication Therapy Management (MTM) Pharmacist  [facility]      Telemedicine Visit Details    Type of service:  Telephone     Start Time: 12:30 PM    End Time: 12:48 AM    Originating Location (pt. Location): Home    Distant Location (provider location):  [facility]     Mode of Communication: Telephone     Current Outpatient Medications   Medication Sig Dispense Refill     aspirin 81 MG EC tablet Take 1 tablet (81 mg total) by mouth daily. 150 tablet 2     atorvastatin (LIPITOR) 40 MG tablet Take 1 tablet (40 mg total) by mouth daily. 90 tablet 3     blood glucose test strips Use one test strip with your Next meter to check blood sugar 3 time(s) daily 300 strip 3     flash glucose sensor (FREESTYLE WALKER 14 DAY SENSOR) Kit Use 1 application As Directed every 14 (fourteen) days. 2 kit 11     FREESTYLE WALKER 14 DAY READER Misc USE AS DIRECTED 1 each 0     ibuprofen  "(ADVIL,MOTRIN) 200 MG tablet Take 200 mg by mouth every 6 (six) hours as needed for pain.       insulin aspart U-100 (NOVOLOG FLEXPEN U-100 INSULIN) 100 unit/mL (3 mL) injection pen Inject 20 Units under the skin 3 (three) times a day before meals.  0     insulin glargine (LANTUS SOLOSTAR U-100 INSULIN) 100 unit/mL (3 mL) pen Inject 45 Units under the skin at bedtime.  0     lisinopriL (PRINIVIL,ZESTRIL) 5 MG tablet TAKE 1 TABLET BY MOUTH EVERY DAY 90 tablet 1     metFORMIN (GLUCOPHAGE) 500 MG tablet 2 tabs AM and 1 tab PM x 3 days, then 2 tabs two times a day. 120 tablet 1     MICROLET LANCET Misc        pen needle, diabetic (BD ULTRA-FINE TRISH PEN NEEDLE) 32 gauge x 5/32\" Ndle Use with lantus and novolog insulin. 100 each 5     No current facility-administered medications for this encounter.            "

## 2021-06-09 NOTE — PROGRESS NOTES
MTM Initial Encounter  Assessment & Plan                                                       Diabetes: Needs improvement-A1c above goal less than 8%.  Fasting sugar is above goal .  Random prandial sugars above goal of less than 180.  Would likely benefit from closer monitoring of blood sugars.  Interested in CGM device.  Will send supplies today and follow-up next week for device education.  Given blood sugars significantly above goal and desire for weight loss, would benefit from consideration of alternative therapies versus continued titration of insulin.  We will start with metformin as first-line option. Pt is using a sliding scale for Novolog - to reduce complexity of management, discussed fixed dose insulin. Again, long-term goal will be to limit/reduce insulin needs.   -Sent Freestyle Amaury reader and sensors   -Start Metformin 500 mg daily x 3 days then 500 mg two times a day   -Pt to use Novolog 20 units three times a day. No sliding scale.       Hypertension: Stable - Last clinic BP at goal <130/80. Denies adverse effects.   -Continue current therapy      Joint Pain: Stable - Has not been using Ibuprofen. Okay to use as needed.   -Continue current therapy       Follow Up  Return in about 1 week (around 6/29/2020) for Medication Management Pharmacist, Via Phone.        Subjective & Objective                                                     Valentin Cartagena is a 45 y.o. male coming in for an initial visit for Medication Therapy Management. He was referred to me from Deepak Bob MD     Patient consented to a telehealth visit: Yes    Chief Complaint: Diabetes Management     Medication Adherence/Access: Denies missed doses of medications.       Diabetes:  Pt currently prescribed Lantus 40 units at bedtime and Novolog 15 units three times a day with meals. Pt is using Lantus 45 units and Novolog 20 units three times a day - using based on a sliding scale.     patient is not currently experiencing  "side effects.   SMBG: twice daily    Ranges (patient reported) :   Fasting: Typically ranging 150-220s.   Random checks throughout the day in the upper 200s- ranging 260-290.     Interested in using CGM device. Has a Mobilitus A20. Doesn't have access to a computer at home.     Patient is not experiencing hypoglycemia   Recent symptoms of high blood sugar? Polyuria, polydipsia, polyphagia (\"sweet tooth\") - none of these constantly.    ACEi/ARB:  Lisinopril 5 mg daily   Statin: Prescribed Atorvastatin 40 mg daily   Aspirin: taking 81mg daily, Denies bleeding/bruising    Diet/Exercise: Interested in some weight loss. Goal would be less than 200 lbs. Tries to stay away from greasy foods. Doesn't like fast food. Is on the road a lot. Not a lot of bread, pasta, rice. Likes meat -pork chops. Drink diet soda, mostly water.     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 Lots of going up and down stairs. Doesn't get much exercise outside work.     Lab Results   Component Value Date    HGBA1C 10.3 (H) 05/18/2020    HGBA1C 10.3 (H) 01/20/2020    HGBA1C 8.7 (H) 08/19/2014     Lab Results   Component Value Date    MICROALBUR 1.70 01/20/2020    LDLCALC 23 01/20/2020    CREATININE 1.05 01/20/2020     Lab Results   Component Value Date    CHOL 74 01/20/2020     Lab Results   Component Value Date    HDL 35 (L) 01/20/2020     Lab Results   Component Value Date    LDLCALC 23 01/20/2020     Lab Results   Component Value Date    TRIG 82 01/20/2020     No components found for: CHOLHDL     The ASCVD Risk score (Gely KHOA Jr., et al., 2013) failed to calculate for the following reasons:    The valid total cholesterol range is 130 to 320 mg/dL         Hypertension: Prescribed Lisinopril 5 mg daily     BP Readings from Last 3 Encounters:   01/20/20 108/78   10/28/19 116/80   01/08/15 120/74        Results for orders placed or performed in visit on 01/20/20   Basic Metabolic Panel   Result Value Ref Range    Sodium 139 136 - 145 " mmol/L    Potassium 4.2 3.5 - 5.0 mmol/L    Chloride 103 98 - 107 mmol/L    CO2 26 22 - 31 mmol/L    Anion Gap, Calculation 10 5 - 18 mmol/L    Glucose 333 (H) 70 - 125 mg/dL    Calcium 9.5 8.5 - 10.5 mg/dL    BUN 11 8 - 22 mg/dL    Creatinine 1.05 0.70 - 1.30 mg/dL    GFR MDRD Af Amer >60 >60 mL/min/1.73m2    GFR MDRD Non Af Amer >60 >60 mL/min/1.73m2        Joint Pain: Prescribed Ibuprofen 200 mg Q6H as needed. Has not been using.         PMH: reviewed in EPIC   Allergies/ADRs: reviewed in EPIC   Alcohol:   Social History     Substance and Sexual Activity   Alcohol Use Not on file       Tobacco:   Social History     Tobacco Use   Smoking Status Never Smoker   Smokeless Tobacco Never Used     Today's Vitals: There were no vitals filed for this visit.  ----------------    Much or all of the text in this note was generated through the use of Dragon Dictate voice-to-text software. Errors in spelling or words which seem out of context are unintentional. Sound alike errors, in particular, may have escaped editing.    The patient declined an after visit summary    I spent 30 minutes with this patient today.   All changes were made via collaborative practice agreement with Deepak Bob MD . A copy of the visit note was provided to the patient's provider.     José Antonio Iqbal PharmD  Medication Therapy Management (MTM) Pharmacist  [facility]      Telemedicine Visit Details    Type of service:  Telephone     Start Time: 11:00 AM   End Time: 11:28 AM    Originating Location (pt. Location): Home    Distant Location (provider [facility]     Mode of Communication: Telephone     Current Outpatient Medications   Medication Sig Dispense Refill     aspirin 81 MG EC tablet Take 1 tablet (81 mg total) by mouth daily. 150 tablet 2     atorvastatin (LIPITOR) 40 MG tablet Take 1 tablet (40 mg total) by mouth daily. 90 tablet 3     blood glucose test strips Use one  "test strip with your Next meter to check blood sugar 3 time(s) daily 300 strip 3     insulin aspart U-100 (NOVOLOG FLEXPEN U-100 INSULIN) 100 unit/mL (3 mL) injection pen Inject 20 Units under the skin 3 (three) times a day before meals.  0     insulin glargine (LANTUS SOLOSTAR U-100 INSULIN) 100 unit/mL (3 mL) pen Inject 45 Units under the skin at bedtime.  0     lisinopriL (PRINIVIL,ZESTRIL) 5 MG tablet TAKE 1 TABLET BY MOUTH EVERY DAY 90 tablet 1     MICROLET LANCET Misc        pen needle, diabetic (BD ULTRA-FINE TRISH PEN NEEDLE) 32 gauge x 5/32\" Ndle Use with lantus and novolog insulin. 100 each 5     flash glucose scanning reader (FREESTYLE WALKER 14 DAY READER) Misc Use 1 application As Directed every 8 (eight) hours. 1 each 0     flash glucose sensor (FREESTYLE WALKER 14 DAY SENSOR) Kit Use 1 application As Directed every 14 (fourteen) days. 2 kit 11     ibuprofen (ADVIL,MOTRIN) 200 MG tablet Take 200 mg by mouth every 6 (six) hours as needed for pain.       metFORMIN (GLUCOPHAGE) 500 MG tablet Take 1 tablet (500 mg total) by mouth daily with breakfast for 3 days, THEN 1 tablet (500 mg total) 2 (two) times a day with meals. 60 tablet 1     No current facility-administered medications for this encounter.              "

## 2021-06-09 NOTE — PROGRESS NOTES
MTM Consult Encounter    Valentin Cartagena is a 45 y.o. male referred for a clinical pharmacist consult from PharmD for CGM teaching     Discussion:     Patient reports he did  metformin. Taking once daily. Did receive the CGM device, but has not started using (as discussed). However, pt currently at work and does not have his meter with him. Rescheduled the visit for next week to review CGM device teaching.       José Antonio Iqbal, Batsheva  Medication Therapy Management (MTM) Pharmacist  [facility]

## 2021-06-10 NOTE — PROGRESS NOTES
"MTM Follow-Up Encounter  Assessment & Plan                                                       Diabetes: Needs improvement-A1c above goal less than 8% ([facility]).  PT reports sugars in the 60s without symptoms of hypoglycemia. Unable to access his reports on his CGM device. Will have pt come to clinic on Wednesday for an in-person visit to download his glucometer readings and provide in-person glucometer eduction. In the mean time, reviewed the plan to have pt avoid adjusting his mealtime insulin.         Follow Up  Return in about 2 days (around 8/19/2020) for Medication Management Pharmacist.        Subjective & Objective                                                     Valentin Cartagena is a 45 y.o. male called for a follow up visit for Medication Therapy Management. He was referred to me from Deepak Bob MD     Patient consented to a telehealth visit: Yes    Chief Complaint: Diabetes Management     Medication Adherence/Access: Denies missed doses of medications.       Diabetes:  Pt currently prescribed Lantus 45 units at bedtime and Novolog 20 units three times a day with meals, Metformin 500 mg 2 tabs two times a day. Still adjusting Novolog based on BG readings. Usually about 24-26 units.     patient is not currently experiencing side effects.   SMBG: CGM device - however no home computer to upload results.  Ranges (patient reported) :     Sometimes fasting sugars are down to 68     Pt unable to access the reports from from his CGM reader.     Patient is experiencing hypoglycemia. but not having symptoms.    Recent symptoms of high blood sugar? Polyuria, polydipsia, polyphagia (\"sweet tooth\") - none of these constantly.    ACEi/ARB:  Lisinopril 5 mg daily   Statin: Prescribed Atorvastatin 40 mg daily   Aspirin: taking 81mg daily, Denies bleeding/bruising          Lab Results   Component Value Date    HGBA1C 10.3 (H) 05/18/2020    HGBA1C 10.3 (H) 01/20/2020    HGBA1C 8.7 (H) 08/19/2014     Lab Results "   Component Value Date    MICROALBUR 1.70 01/20/2020    LDLCALC 23 01/20/2020    CREATININE 1.05 01/20/2020     Lab Results   Component Value Date    CHOL 74 01/20/2020     Lab Results   Component Value Date    HDL 35 (L) 01/20/2020     Lab Results   Component Value Date    LDLCALC 23 01/20/2020     Lab Results   Component Value Date    TRIG 82 01/20/2020     No components found for: CHOLHDL     The ASCVD Risk score ([place] COUCH Jr., et al., 2013) failed to calculate for the following reasons:    The valid total cholesterol range is 130 to 320 mg/dL           PMH: reviewed in EPIC   Allergies/ADRs: reviewed in EPIC   Alcohol:   Social History     Substance and Sexual Activity   Alcohol Use Not on file       Tobacco:   Social History     Tobacco Use   Smoking Status Never Smoker   Smokeless Tobacco Never Used     Today's Vitals: There were no vitals filed for this visit.  ----------------    The patient declined an after visit summary    I spent 15 minutes with this patient today.   All changes were made via collaborative practice agreement with Deepak Bob MD . A copy of the visit note was provided to the patient's provider.     José Antonio Iqbal PharmD  Medication Therapy Management (MTM) Pharmacist  [facility]      Telemedicine Visit Details    Type of service:  Telephone     Start Time: 11:38 AM   End Time: 11:52 AM    Originating Location (pt. Location): Home    Distant Location (provider location):  [facility]     Mode of Communication: Telephone     Current Outpatient Medications   Medication Sig Dispense Refill     metFORMIN (GLUCOPHAGE) 500 MG tablet 2 tabs AM and 1 tab PM x 3 days, then 2 tabs two times a day. 120 tablet 1     aspirin 81 MG EC tablet Take 1 tablet (81 mg total) by mouth daily. 150 tablet 2     atorvastatin (LIPITOR) 40 MG tablet Take 1 tablet (40 mg total) by mouth daily. 90 tablet 3     blood glucose test strips Use one test strip  "with your Next meter to check blood sugar 3 time(s) daily 300 strip 3     flash glucose sensor (FREESTYLE WALKER 14 DAY SENSOR) Kit Use 1 application As Directed every 14 (fourteen) days. 2 kit 11     FREESTYLE WALKER 14 DAY READER [facility] USE AS DIRECTED 1 each 0     ibuprofen (ADVIL,MOTRIN) 200 MG tablet Take 200 mg by mouth every 6 (six) hours as needed for pain.       insulin aspart U-100 (NOVOLOG FLEXPEN U-100 INSULIN) 100 unit/mL (3 mL) injection pen Inject 20 Units under the skin 3 (three) times a day before meals.  0     LANTUS SOLOSTAR U-100 INSULIN 100 unit/mL (3 mL) pen ADMINISTER 40 UNITS UNDER THE SKIN AT BEDTIME 15 mL 0     lisinopriL (PRINIVIL,ZESTRIL) 5 MG tablet TAKE 1 TABLET BY MOUTH EVERY DAY 90 tablet 1     MICROLET LANCET Misc        pen needle, diabetic (BD ULTRA-FINE TRISH PEN NEEDLE) 32 gauge x 5/32\" Ndle Use with lantus and novolog insulin. 100 each 5     No current facility-administered medications for this encounter.          "

## 2021-06-11 NOTE — TELEPHONE ENCOUNTER
FYI - Status Update  Who is Calling: Patient  Update: I had a death in my family and I am leaving for  [place]. Please fill asap.   Okay to leave a detailed message?:  Yes

## 2021-06-11 NOTE — TELEPHONE ENCOUNTER
Medication Question or Clarification  Who is calling: patient  What medication are you calling about (include dose and sig)?: exenatide microspheres (BYDUREON) 2 mg/0.65 mL PnIj extended release injection   Who prescribed the medication?: Provider, No Primary Care    What is your question/concern?: patient missed his appointment with pharmD José Antonio Iqbal, PharmD  insulin instructions patient would like to go over instructions   Requested Pharmacy: [facility] to leave a detailed message?: Yes

## 2021-06-11 NOTE — TELEPHONE ENCOUNTER
Refill Approved    Rx renewed per Medication Renewal Policy. Medication was last renewed on 8/5/20.    Ellen Miranda, [facility] Triage/Med Refill 9/14/2020     Requested Prescriptions   Pending Prescriptions Disp Refills     insulin glargine (LANTUS SOLOSTAR U-100 INSULIN) 100 unit/mL (3 mL) pen 15 mL 0       Insulin/GLP-1 Refill Protocol Passed - 9/14/2020  8:09 AM        Passed - Visit with PCP or prescribing provider visit in last 6 months     Last office visit with prescriber/PCP: Visit date not found OR same dept: 1/20/2020 Deepak Bob MD OR same specialty: 1/20/2020 Deepak Bob MD Last physical: Visit date not found Last MTM visit: Visit date not found     Next appt within 3 mo: Visit date not found  Next physical within 3 mo: Visit date not found  Prescriber OR PCP: Deepak Bob MD  Last diagnosis associated with med order: 1. Type 2 diabetes mellitus without complication, with long-term current use of insulin (H)  - insulin glargine (LANTUS SOLOSTAR U-100 INSULIN) 100 unit/mL (3 mL) pen  Dispense: 15 mL; Refill: 0    If protocol passes may refill for 6 months if within 3 months of last provider visit (or a total of 9 months).              Passed - A1C in last 6 months     Hemoglobin A1c   Date Value Ref Range Status   05/18/2020 10.3 (H) 3.5 - 6.0 % Final               Passed - Microalbumin in last year     Microalbumin, Random Urine   Date Value Ref Range Status   01/20/2020 1.70 0.00 - 1.99 mg/dL Final                  Passed - Blood pressure in last year     BP Readings from Last 1 Encounters:   01/20/20 108/78             Passed - Creatinine done in last year     Creatinine   Date Value Ref Range Status   01/20/2020 1.05 0.70 - 1.30 mg/dL Final

## 2021-06-12 NOTE — TELEPHONE ENCOUNTER
Patient Returning Call  Reason for call:  Call back  Information relayed to patient:  Writer relayed message to Pt: Insulin rx renewed.   Needs appt. For follow up    Pt agrees, and understands.     Patient has additional questions:  No  If YES, what are your questions/concerns:  N/A  Okay to leave a detailed message?: No call back needed

## 2021-06-13 NOTE — TELEPHONE ENCOUNTER
Patient declined to schedule, stated that he is still out of town and not sure when he will be back, he will call to schedule appt when he returns. At this time, we will no longer make an attempt to schedule this appointment. Completing task.

## 2021-06-13 NOTE — TELEPHONE ENCOUNTER
Patient declined to schedule an appointment at this time as he is out of state. He is not sure when he will be back. Postponing task out 2 weeks and will try again.

## 2021-06-14 NOTE — TELEPHONE ENCOUNTER
----- Message from Deepak Bob MD sent at 10/14/2020 12:38 PM CDT -----  Need appt for diabetes follow up

## 2021-06-16 PROBLEM — Z79.4 TYPE 2 DIABETES MELLITUS WITH HYPERGLYCEMIA, WITH LONG-TERM CURRENT USE OF INSULIN (H): Status: ACTIVE | Noted: 2021-05-12

## 2021-06-16 PROBLEM — E11.65 TYPE 2 DIABETES MELLITUS WITH HYPERGLYCEMIA, WITH LONG-TERM CURRENT USE OF INSULIN (H): Status: ACTIVE | Noted: 2021-05-12

## 2021-06-17 NOTE — PROGRESS NOTES
"Subjective:  45 y.o. male with concerns of follow-up on diabetes.  He was recently staying in [place] with an aunt.  He is looking for work back in [place] now.  Denies excessive thirst or urination.  He is not taking Bydureon.    Denies any concerns other than trying to get back on his medication.  He is not sure exactly which medicines he has been taking or not taking.    Outpatient Medications Prior to Visit   Medication Sig Dispense Refill     aspirin 81 MG EC tablet TAKE 1 TABLET BY MOUTH EVERY  tablet 2     atorvastatin (LIPITOR) 40 MG tablet Take 1 tablet (40 mg total) by mouth daily. 90 tablet 3     blood glucose test (CONTOUR NEXT TEST STRIPS) strips USE TO TEST BLOOD SUGAR THREE TIMES DAILY 300 strip 3     exenatide microspheres (BYDUREON) 2 mg/0.65 mL PnIj extended release injection Inject 0.65 mL (2 mg total) under the skin every 7 days. 4 each 3     flash glucose sensor (FREESTYLE WALKER 14 DAY SENSOR) Kit Use 1 application As Directed every 14 (fourteen) days. 2 kit 11     FREESTYLE WALKER 14 DAY READER [facility] USE AS DIRECTED 1 each 0     ibuprofen (ADVIL,MOTRIN) 200 MG tablet Take 200 mg by mouth every 6 (six) hours as needed for pain.       insulin aspart U-100 (NOVOLOG FLEXPEN U-100 INSULIN) 100 unit/mL (3 mL) injection pen Inject 15 Units under the skin 3 (three) times a day before meals. 11.65 Type 2 with hyperglycemia 12 mL 1     LANTUS SOLOSTAR U-100 INSULIN 100 unit/mL (3 mL) pen ADMINISTER 40 UNITS UNDER THE SKIN AT BEDTIME 15 mL 0     lisinopriL (PRINIVIL,ZESTRIL) 5 MG tablet TAKE 1 TABLET BY MOUTH EVERY DAY 90 tablet 1     metFORMIN (GLUCOPHAGE) 500 MG tablet TAKE 2 TABLETS BY MOUTH IN MORNING AND 1 TABLET IN THE EVENING FOR 3 DAYS, THEN 2 TABLETS BY MOUTH TWICE DAILY 120 tablet 1     MICROLET LANCET Misc        pen needle, diabetic (BD ULTRA-FINE TRISH PEN NEEDLE) 32 gauge x 5/32\" Ndle Use with lantus and novolog insulin. 100 each 5     No facility-administered medications prior to " "visit.       Social History     Tobacco Use   Smoking Status Never Smoker   Smokeless Tobacco Never Used      Objective:  /78 (Patient Site: Left Arm, Patient Position: Sitting, Cuff Size: Adult Large)   Pulse (!) 101   Ht 5' 11.5\" (1.816 m)   Wt 216 lb (98 kg)   BMI 29.71 kg/m    GENERAL: alert, not distressed  CHEST: clear, no rales, rhonchi, or wheezes  CARDIAC: regular without murmur, gallop, or rub  ABDOMEN: soft, non tender, non distended, normal bowel sounds     FOOT EXAM:  DP and PT pulses are normal.  Monofilament testing normal  Nails normal.  Hair growth normal.    Recent Results (from the past 24 hour(s))   Glycosylated Hemoglobin A1c   Result Value Ref Range    Hemoglobin A1c 8.8 (H) <=5.6 %      Assessment and Plan:   1. Type 2 diabetes mellitus without complication, with long-term current use of insulin (H)  Incomplete control of diabetes.  Discussed increasing doses of Metformin (he was only taking 500 once per day.),  Increasing to 25 units of NovoLog with meals and increasing basal insulin to 45 units nightly.  - Glycosylated Hemoglobin A1c  - Basic Metabolic Panel  - Microalbumin, Random Urine  - Hepatitis C Antibody (Anti-HCV)  - Lipid [place] FASTING  - insulin aspart U-100 (NOVOLOG FLEXPEN U-100 INSULIN) 100 unit/mL (3 mL) injection pen; Inject 25 Units under the skin 3 (three) times a day before meals. 11.9 Type 2 without complications  Dispense: 9 mL; Refill: 1  - insulin glargine (BASAGLAR KWIKPEN) 100 unit/mL (3 mL) pen; Inject 45 Units under the skin at bedtime.  Dispense: 15 mL; Refill: 1  - lisinopriL (PRINIVIL,ZESTRIL) 5 MG tablet; Take 1 tablet (5 mg total) by mouth daily.  Dispense: 90 tablet; Refill: 1  - aspirin 81 MG EC tablet; Take 1 tablet (81 mg total) by mouth daily.  Dispense: 150 tablet; Refill: 2  - atorvastatin (LIPITOR) 40 MG tablet; Take 1 tablet (40 mg total) by mouth daily.  Dispense: 90 tablet; Refill: 3  - Ambulatory referral to Ophthalmology - [facility]" [place]  - metFORMIN (GLUCOPHAGE) 1000 MG tablet; Take 1 tablet (1,000 mg total) by mouth 2 (two) times a day. TAKE 2 TABLETS BY MOUTH IN MORNING AND 1 TABLET IN THE EVENING FOR 3 DAYS, THEN 2 TABLETS BY MOUTH TWICE DAILY  Dispense: 180 tablet; Refill: 0       Coronavirus vaccine recommended but he declined to schedule it.

## 2021-06-20 NOTE — LETTER
"Letter by Deepak Bob MD at      Author: Deepak Bob MD Service: -- Author Type: --    Filed:  Encounter Date: 1/20/2020 Status: Signed         Valentin Cartagena  [address] [place] [address]     January 20, 2020     Dear Mr. Cartagena,    Below are the results from your recent visit:    Resulted Orders   Lipid [place] FASTING   Result Value Ref Range    Cholesterol 74 <=199 mg/dL    Triglycerides 82 <=149 mg/dL    HDL Cholesterol 35 (L) >=40 mg/dL    LDL Calculated 23 <=129 mg/dL    Patient Fasting > 8hrs? No    Glycosylated Hemoglobin A1c   Result Value Ref Range    Hemoglobin A1c 10.3 (H) 3.5 - 6.0 %   Basic Metabolic Panel   Result Value Ref Range    Sodium 139 136 - 145 mmol/L    Potassium 4.2 3.5 - 5.0 mmol/L    Chloride 103 98 - 107 mmol/L    CO2 26 22 - 31 mmol/L    Anion Gap, Calculation 10 5 - 18 mmol/L    Glucose 333 (H) 70 - 125 mg/dL    Calcium 9.5 8.5 - 10.5 mg/dL    BUN 11 8 - 22 mg/dL    Creatinine 1.05 0.70 - 1.30 mg/dL    GFR MDRD Af Amer >60 >60 mL/min/1.73m2    GFR MDRD Non Af Amer >60 >60 mL/min/1.73m2    Narrative    Fasting Glucose reference range is 70-99 mg/dL per  American Diabetes Association (ADA) guidelines.   Microalbumin, Random Urine   Result Value Ref Range    Microalbumin, Random Urine 1.70 0.00 - 1.99 mg/dL    Creatinine, Urine 93.5 mg/dL    Microalbumin/Creatinine Ratio Random Urine 18.2 <=19.9 mg/g    Narrative    Microalbumin, Random Urine  <2.0 mg/dL . . . . . . . . Normal  3.0-30.0 mg/dL . . . . . . Microalbuminuria  >30.0 mg/dL . . . . . .  . Clinical Proteinuria    Microalbumin/Creatinine Ratio, Random Urine  <20 mg/g . . . . .. . . . Normal   mg/g . . . . . . . Microalbuminuria  >300 mg/g . . . . . . . . Clinical Proteinuria           Hemoglobin A1c is an \"average blood sugar\" for the last 3 months.  We want to get this down below 7 to prevent complications from diabetes.  Do the med changes we talked about and see us back in three months.    Please call " with questions or contact us using [employer].    Sincerely,        Electronically signed by Deepak Bob MD

## 2021-06-28 ENCOUNTER — COMMUNICATION - HEALTHEAST (OUTPATIENT)
Dept: FAMILY MEDICINE | Facility: CLINIC | Age: 46
End: 2021-06-28

## 2021-06-28 DIAGNOSIS — Z79.4 TYPE 2 DIABETES MELLITUS WITHOUT COMPLICATION, WITH LONG-TERM CURRENT USE OF INSULIN (H): ICD-10-CM

## 2021-06-28 DIAGNOSIS — E11.9 TYPE 2 DIABETES MELLITUS WITHOUT COMPLICATION, WITH LONG-TERM CURRENT USE OF INSULIN (H): ICD-10-CM

## 2021-06-29 NOTE — PROGRESS NOTES
"Progress Notes by José Antonio Iqbal, PharmD at 8/19/2020  1:00 PM     Author: José Antonio Iqbal, PharmD Service: -- Author Type: Pharmacist    Filed: 8/19/2020  2:38 PM Encounter Date: 8/19/2020 Status: Signed    : José Antonio Iqbal, PharmD (Pharmacist)       MTM Consult Encounter    Valentin Cartagena is a 45 y.o. male referred for a clinical pharmacist consult from PharmD for Freestyle Amaury Device upload and Device education.     Discussion:      Diabetes:  Pt currently prescribed Lantus 45 units at bedtime and Novolog 20 units three times a day with meals, Metformin 500 mg 2 tabs two times a day. Still adjusting Novolog based on BG readings. Usually about 24-26 units. patient is not currently experiencing side effects.         Patient is experiencing hypoglycemia. but not having symptoms.    Recent symptoms of high blood sugar? Polyuria, polydipsia, polyphagia (\"sweet tooth\") - none of these constantly.    ACEi/ARB:  Lisinopril 5 mg daily   Statin: Prescribed Atorvastatin 40 mg daily   Aspirin: taking 81mg daily, Denies bleeding/bruising    Had a few days when the sensor fell off so no readings.         Diet/Exercise: - \"eating what I have the taste for\" Don't eat a lot of pasta, rice every so often, toast once in a while. Tries to cook at home.     Drinks diet soda. Sometimes drinks cranberry juice   Walking around the block - not doing it daily - every other day.                        Patient wondering if he would be a candidate for injectable medications for weight loss.       Plan:     Educated pt on how to read reports from his CGM reader. We did also set up his phone to be able to scan and directly upload readings to clinic account. He will start this with new sensor in 9 days.     Reviewed that pt is likely a candidate for GLP1, but will hold off on until pt is no longer having lows. Discussed that lows are likely due to the Novolog adjustments patient is making. Will have pt use consistent dose.       1. CGM " "device education - reviewed how to read reports on meter and how to use phone as a scanner.   2. Pt to use Novolog 20 units three times a day - do not adjust dose based on BG.     Follow up:   Return in about 2 weeks (around 9/2/2020) for Medication Management Pharmacist, Via Phone.        José Antonio Iqbal, PharmD  Medication Therapy Management (MTM) Pharmacist  [facility]          Current Outpatient Medications   Medication Sig Dispense Refill   ? aspirin 81 MG EC tablet Take 1 tablet (81 mg total) by mouth daily. 150 tablet 2   ? atorvastatin (LIPITOR) 40 MG tablet Take 1 tablet (40 mg total) by mouth daily. 90 tablet 3   ? lisinopriL (PRINIVIL,ZESTRIL) 5 MG tablet TAKE 1 TABLET BY MOUTH EVERY DAY 90 tablet 1   ? metFORMIN (GLUCOPHAGE) 500 MG tablet 2 tabs AM and 1 tab PM x 3 days, then 2 tabs two times a day. 120 tablet 1   ? blood glucose test strips Use one test strip with your Next meter to check blood sugar 3 time(s) daily 300 strip 3   ? flash glucose sensor (FREESTYLE WALKER 14 DAY SENSOR) Kit Use 1 application As Directed every 14 (fourteen) days. 2 kit 11   ? FREESTYLE WALKER 14 DAY READER Misc USE AS DIRECTED 1 each 0   ? ibuprofen (ADVIL,MOTRIN) 200 MG tablet Take 200 mg by mouth every 6 (six) hours as needed for pain.     ? insulin aspart U-100 (NOVOLOG FLEXPEN U-100 INSULIN) 100 unit/mL (3 mL) injection pen Inject 20 Units under the skin 3 (three) times a day before meals.  0   ? LANTUS SOLOSTAR U-100 INSULIN 100 unit/mL (3 mL) pen ADMINISTER 40 UNITS UNDER THE SKIN AT BEDTIME 15 mL 0   ? MICROLET LANCET Misc      ? pen needle, diabetic (BD ULTRA-FINE TRISH PEN NEEDLE) 32 gauge x 5/32\" Ndle Use with lantus and novolog insulin. 100 each 5     No current facility-administered medications for this visit.                              "

## 2021-06-29 NOTE — PROGRESS NOTES
"Progress Notes by José Antonio Iqbal, PharmD at 9/2/2020  2:00 PM     Author: José Antonio Iqbal, PharmD Service: -- Author Type: Pharmacist    Filed: 9/2/2020  2:56 PM Encounter Date: 9/2/2020 Status: Signed    : José Antonio Iqbal, Batsheva (Pharmacist)       MTM Follow-Up Encounter  Assessment & Plan                                                       Diabetes: Needs Improvement (Needs additional drug therapy) - A1C above goal <7%. Patient states that they are following directions with monitoring their CGM however the chart shows that they are occasionally missing some readings, especially before bed. Fasting sugars fluctuate from hypoglycemia to well above goal . Post-prandial sugars, typically above goal <180.    Pt wasn't very clear on what \"carbohydrates\" are. May benefit from further education on dietary approaches.     Discussed with the patient about adding a GLP-1 medication to their med list to help better manage their BG levels and help with weight loss. Some options that are more often covered with MA include Bydureon (once weekly) or Victoza (once daily). Pt preferred Bydureon (once weekly). Will follow up with the patient in clinic, in one week to go over the proper use of the medication.   -Sent Rx for Bydureon 2 mg weekly.   -Pt to scan CGM sensor four times a day.   -Ambulatory referral for diabetes education       Follow Up  Return in about 1 week (around 9/9/2020) for Medication Management Pharmacist, with PCP.        Subjective & Objective                                                     Valentin Cartagena is a 45 y.o. male called for a follow up visit for Medication Therapy Management. He was referred to me from Deepak Bob MD     Patient consented to a telehealth visit: Yes    Chief Complaint: Diabetes Management -  Patient has no new questions or concerns about their medications.    Medication Adherence/Access: Denies missed doses of medications.     Diabetes:  Pt currently prescribed Lantus " "45 units at bedtime and Novolog 20 units three times a day with meals, Metformin 500 mg 2 tabs two times a day.     patient is not currently experiencing side effects.   SMBG: CGM device - however no home computer to upload results.  Ranges (patient reported) :                     Patient is experiencing hypoglycemia. but not having symptoms.    Recent symptoms of high blood sugar? Polyuria, polydipsia, polyphagia (\"sweet tooth\") - none of these constantly.    ACEi/ARB:  Lisinopril 5 mg daily   Statin: Prescribed Atorvastatin 40 mg daily   Aspirin: taking 81mg daily, Denies bleeding/bruising    Diet: still have sweet tooth \"every now and then\"       Lab Results   Component Value Date    HGBA1C 10.3 (H) 05/18/2020    HGBA1C 10.3 (H) 01/20/2020    HGBA1C 8.7 (H) 08/19/2014     Lab Results   Component Value Date    MICROALBUR 1.70 01/20/2020    LDLCALC 23 01/20/2020    CREATININE 1.05 01/20/2020     Lab Results   Component Value Date    CHOL 74 01/20/2020     Lab Results   Component Value Date    HDL 35 (L) 01/20/2020     Lab Results   Component Value Date    LDLCALC 23 01/20/2020     Lab Results   Component Value Date    TRIG 82 01/20/2020     No components found for: CHOLHDL     The ASCVD Risk score (Konawa DC Jr., et al., 2013) failed to calculate for the following reasons:    The valid total cholesterol range is 130 to 320 mg/dL           PMH: reviewed in EPIC   Allergies/ADRs: reviewed in EPIC   Alcohol:   Social History     Substance and Sexual Activity   Alcohol Use Not on file       Tobacco:   Social History     Tobacco Use   Smoking Status Never Smoker   Smokeless Tobacco Never Used     Today's Vitals: There were no vitals filed for this visit.  ----------------    The patient declined an after visit summary    I spent 22 minutes with this patient today.   All changes were made via collaborative practice agreement with Deepak Bob MD . A copy of the visit note was provided to the patient's provider.     " "  Jade Baptiste, PharmD IV Student     José Antonio Iqbal, PharmD  Medication Therapy Management (MTM) Pharmacist  [facility]          Telemedicine Visit Details    Type of service:  Telephone     Start Time: 2:08 PM   End Time: 2:30 AM    Originating Location (pt. Location): Home    Distant Location (provider location):  [facility] MEDICATION THERAPY MANAGEMENT [facility]     Mode of Communication: Telephone     Current Outpatient Medications   Medication Sig Dispense Refill   ? aspirin 81 MG EC tablet Take 1 tablet (81 mg total) by mouth daily. 150 tablet 2   ? atorvastatin (LIPITOR) 40 MG tablet Take 1 tablet (40 mg total) by mouth daily. 90 tablet 3   ? blood glucose test strips Use one test strip with your Next meter to check blood sugar 3 time(s) daily 300 strip 3   ? flash glucose sensor (FREESTYLE WALKER 14 DAY SENSOR) Kit Use 1 application As Directed every 14 (fourteen) days. 2 kit 11   ? FREESTYLE WALKER 14 DAY READER Misc USE AS DIRECTED 1 each 0   ? ibuprofen (ADVIL,MOTRIN) 200 MG tablet Take 200 mg by mouth every 6 (six) hours as needed for pain.     ? LANTUS SOLOSTAR U-100 INSULIN 100 unit/mL (3 mL) pen ADMINISTER 40 UNITS UNDER THE SKIN AT BEDTIME 15 mL 0   ? lisinopriL (PRINIVIL,ZESTRIL) 5 MG tablet TAKE 1 TABLET BY MOUTH EVERY DAY 90 tablet 1   ? metFORMIN (GLUCOPHAGE) 500 MG tablet TAKE 2 TABLETS BY MOUTH IN MORNING AND 1 TABLET IN THE EVENING FOR 3 DAYS, THEN 2 TABLETS BY MOUTH TWICE DAILY 120 tablet 1   ? MICROLET LANCET Misc      ? NOVOLOG FLEXPEN U-100 INSULIN 100 unit/mL (3 mL) injection pen INJECT 15 UNITS UNDER THE SKIN THREE TIMES DAILY BEFORE MEALS 15 mL 0   ? pen needle, diabetic (BD ULTRA-FINE TRISH PEN NEEDLE) 32 gauge x 5/32\" Ndle Use with lantus and novolog insulin. 100 each 5     No current facility-administered medications for this visit.                     "

## 2021-07-01 ENCOUNTER — COMMUNICATION - HEALTHEAST (OUTPATIENT)
Dept: FAMILY MEDICINE | Facility: CLINIC | Age: 46
End: 2021-07-01

## 2021-07-01 DIAGNOSIS — E11.9 TYPE 2 DIABETES MELLITUS WITHOUT COMPLICATION, WITH LONG-TERM CURRENT USE OF INSULIN (H): ICD-10-CM

## 2021-07-01 DIAGNOSIS — Z79.4 TYPE 2 DIABETES MELLITUS WITHOUT COMPLICATION, WITH LONG-TERM CURRENT USE OF INSULIN (H): ICD-10-CM

## 2021-07-04 NOTE — TELEPHONE ENCOUNTER
Telephone Encounter by Jenna Edmondson, RN at 7/1/2021  7:33 PM     Author: Jenna Edmondson RN Service: -- Author Type: Registered Nurse    Filed: 7/1/2021  7:34 PM Encounter Date: 7/1/2021 Status: Signed    : Jenna Edmondson RN (Registered Nurse)       RN cannot approve Refill Request    RN can NOT refill this medication alternative med needed. Last office visit: 5/12/2021 Deepak oBb MD Last Physical: Visit date not found Last MTM visit: Visit date not found Last visit same specialty: 5/12/2021 Deeapk Bob MD.  Next visit within 3 mo: Visit date not found  Next physical within 3 mo: Visit date not found      [facility] Triage/Med Refill 7/1/2021    Requested Prescriptions   Pending Prescriptions Disp Refills   ? NOVOLOG FLEXPEN U-100 INSULIN 100 unit/mL (3 mL) injection pen [Pharmacy Med Name: NOVOLOG FLEXPEN INJ 3ML (ORANGE)] 9 mL 1     Sig: INJECT 25 UNITS UNDER THE SKIN THREE TIMES DAILY BEFORE MEALS       Insulin/GLP-1 Refill Protocol Passed - 7/1/2021  4:43 PM        Passed - Visit with PCP or prescribing provider visit in last 6 months     Last office visit with prescriber/PCP: 5/12/2021 OR same dept: 5/12/2021 Deepak Bob MD OR same specialty: 5/12/2021 Deepak Bob MD Last physical: Visit date not found Last MTM visit: Visit date not found     Next appt within 3 mo: Visit date not found  Next physical within 3 mo: Visit date not found  Prescriber OR PCP: Deepak Bob MD  Last diagnosis associated with med order: 1. Type 2 diabetes mellitus without complication, with long-term current use of insulin (H)  - NOVOLOG FLEXPEN U-100 INSULIN 100 unit/mL (3 mL) injection pen [Pharmacy Med Name: NOVOLOG FLEXPEN INJ 3ML (ORANGE)]; INJECT 25 UNITS UNDER THE SKIN THREE TIMES DAILY BEFORE MEALS  Dispense: 9 mL; Refill: 1    If protocol passes may refill for 6 months if within 3 months of last provider visit (or a total of 9 months).              Passed - A1C in  last 6 months     Hemoglobin A1c   Date Value Ref Range Status   05/12/2021 8.8 (H) <=5.6 % Final               Passed - Microalbumin in last year     Microalbumin, Random Urine   Date Value Ref Range Status   05/12/2021 1.39 0.00 - 1.99 mg/dL Final                  Passed - Blood pressure in last year     BP Readings from Last 1 Encounters:   05/12/21 118/78             Passed - Creatinine done in last year     Creatinine   Date Value Ref Range Status   05/12/2021 1.02 0.70 - 1.30 mg/dL Final

## 2021-07-04 NOTE — TELEPHONE ENCOUNTER
Telephone Encounter by Lia Dickey MA at 7/2/2021  8:14 AM     Author: Lia Dickey MA Service: -- Author Type: Medical Assistant    Filed: 7/2/2021  8:14 AM Encounter Date: 7/1/2021 Status: Signed    : Lia Dickey MA (Medical Assistant)       Medication refill requested. Please authorize medication if appropriate.

## 2021-07-07 NOTE — TELEPHONE ENCOUNTER
"Refill Approved    Rx renewed per Medication Renewal Policy. Medication was last renewed on 1/20/20.    Marco Seymour, Care Connection Triage/Med Refill 6/28/2021     Requested Prescriptions   Pending Prescriptions Disp Refills     pen needle, diabetic (BD ULTRA-FINE TRISH PEN NEEDLE) 32 gauge x 5/32\" Ndle [Pharmacy Med Name: B-D PEN NDL TRISH [id number] GRN] 100 each 5     Sig: USE WITH LANTUS AND NOVOLOG INSULIN       Diabetic Supplies Refill Protocol Passed - 6/28/2021 12:27 PM        Passed - Visit with PCP or prescribing provider visit in last 6 months     Last office visit with prescriber/PCP: 5/12/2021 Deepak Bob MD OR same dept: 5/12/2021 Deepak Bob MD OR same specialty: 5/12/2021 Deepak Bob MD  Last physical: Visit date not found Last MTM visit: Visit date not found   Next visit within 3 mo: Visit date not found  Next physical within 3 mo: Visit date not found  Prescriber OR PCP: Deepak Bob MD  Last diagnosis associated with med order: 1. Type 2 diabetes mellitus without complication, with long-term current use of insulin (H)  - BD ULTRA-FINE TRISH PEN NEEDLE 32 gauge x 5/32\" Ndle [Pharmacy Med Name: B-D PEN NDL TRISH [id number] GRN]; USE WITH LANTUS AND NOVOLOG INSULIN  Dispense: 100 each; Refill: 5    If protocol passes may refill for 12 months if within 3 months of last provider visit (or a total of 15 months).             Passed - A1C in last 6 months     Hemoglobin A1c   Date Value Ref Range Status   05/12/2021 8.8 (H) <=5.6 % Final                              "

## 2021-09-24 ENCOUNTER — OFFICE VISIT (OUTPATIENT)
Dept: FAMILY MEDICINE | Facility: CLINIC | Age: 46
End: 2021-09-24
Payer: COMMERCIAL

## 2021-09-24 VITALS
RESPIRATION RATE: 18 BRPM | WEIGHT: 223.5 LBS | SYSTOLIC BLOOD PRESSURE: 118 MMHG | TEMPERATURE: 97.9 F | HEART RATE: 101 BPM | DIASTOLIC BLOOD PRESSURE: 79 MMHG | BODY MASS INDEX: 30.74 KG/M2

## 2021-09-24 DIAGNOSIS — E11.9 TYPE 2 DIABETES MELLITUS WITHOUT COMPLICATION, WITH LONG-TERM CURRENT USE OF INSULIN (H): ICD-10-CM

## 2021-09-24 DIAGNOSIS — Z79.4 TYPE 2 DIABETES MELLITUS WITHOUT COMPLICATION, WITH LONG-TERM CURRENT USE OF INSULIN (H): ICD-10-CM

## 2021-09-24 DIAGNOSIS — E11.65 TYPE 2 DIABETES MELLITUS WITH HYPERGLYCEMIA, WITH LONG-TERM CURRENT USE OF INSULIN (H): Primary | ICD-10-CM

## 2021-09-24 DIAGNOSIS — I10 ESSENTIAL HYPERTENSION: ICD-10-CM

## 2021-09-24 DIAGNOSIS — Z79.4 TYPE 2 DIABETES MELLITUS WITH HYPERGLYCEMIA, WITH LONG-TERM CURRENT USE OF INSULIN (H): Primary | ICD-10-CM

## 2021-09-24 LAB
ALBUMIN SERPL-MCNC: 3.7 G/DL (ref 3.5–5)
ALP SERPL-CCNC: 77 U/L (ref 45–120)
ALT SERPL W P-5'-P-CCNC: 24 U/L (ref 0–45)
ANION GAP SERPL CALCULATED.3IONS-SCNC: 11 MMOL/L (ref 5–18)
AST SERPL W P-5'-P-CCNC: 17 U/L (ref 0–40)
BILIRUB SERPL-MCNC: 0.5 MG/DL (ref 0–1)
BUN SERPL-MCNC: 11 MG/DL (ref 8–22)
CALCIUM SERPL-MCNC: 9.7 MG/DL (ref 8.5–10.5)
CHLORIDE BLD-SCNC: 105 MMOL/L (ref 98–107)
CHOLEST SERPL-MCNC: 75 MG/DL
CO2 SERPL-SCNC: 24 MMOL/L (ref 22–31)
CREAT SERPL-MCNC: 1.04 MG/DL (ref 0.7–1.3)
FASTING STATUS PATIENT QL REPORTED: ABNORMAL
GFR SERPL CREATININE-BSD FRML MDRD: 85 ML/MIN/1.73M2
GLUCOSE BLD-MCNC: 276 MG/DL (ref 70–125)
HBA1C MFR BLD: 9.3 % (ref 0–5.6)
HDLC SERPL-MCNC: 35 MG/DL
LDLC SERPL CALC-MCNC: 23 MG/DL
POTASSIUM BLD-SCNC: 4.4 MMOL/L (ref 3.5–5)
PROT SERPL-MCNC: 7.4 G/DL (ref 6–8)
SODIUM SERPL-SCNC: 140 MMOL/L (ref 136–145)
TRIGL SERPL-MCNC: 83 MG/DL
TSH SERPL DL<=0.005 MIU/L-ACNC: 0.51 UIU/ML (ref 0.3–5)

## 2021-09-24 PROCEDURE — 99214 OFFICE O/P EST MOD 30 MIN: CPT | Performed by: FAMILY MEDICINE

## 2021-09-24 PROCEDURE — 80053 COMPREHEN METABOLIC PANEL: CPT | Performed by: FAMILY MEDICINE

## 2021-09-24 PROCEDURE — 83036 HEMOGLOBIN GLYCOSYLATED A1C: CPT | Performed by: FAMILY MEDICINE

## 2021-09-24 PROCEDURE — 80061 LIPID PANEL: CPT | Performed by: FAMILY MEDICINE

## 2021-09-24 PROCEDURE — 36415 COLL VENOUS BLD VENIPUNCTURE: CPT | Performed by: FAMILY MEDICINE

## 2021-09-24 PROCEDURE — 84443 ASSAY THYROID STIM HORMONE: CPT | Performed by: FAMILY MEDICINE

## 2021-09-24 RX ORDER — METFORMIN HYDROCHLORIDE 1000 MG/1
2000 TABLET, FILM COATED, EXTENDED RELEASE ORAL
Qty: 90 TABLET | Refills: 3 | Status: SHIPPED | OUTPATIENT
Start: 2021-09-24 | End: 2021-09-28

## 2021-09-24 NOTE — PROGRESS NOTES
Office Visit  [facility]  Date of Service: Sep 24, 2021      Subjective   Valentin Cartagena is a 46 year old male who presents for   Chief Complaint   Patient presents with     Follow Up     diabete, need novolog     Diabetes  Sugars 150-200 before breakfast, after work 150-200 before eating  No lows - lowest in low 100s  Snacks at work, no meals. Breakfast varies - sometimes none, sometimes grabs something on the way to work.      Insulin:  In the morning, he corrects with novolog 25-30 units, takes 25 units with supper.  Consistently using lantus 45 units every night.     Declines covid shot and flu shot - will consider.    The ASCVD Risk score (Gelybeulah COUCH Jr., et al., 2013) failed to calculate for the following reasons:    The valid total cholesterol range is 130 to 320 mg/dL      Objective   /79 (BP Location: Left arm, Patient Position: Sitting, Cuff Size: Adult Large)   Pulse 101   Temp 97.9  F (36.6  C) (Temporal)   Resp 18   Wt 101.4 kg (223 lb 8 oz)   BMI 30.74 kg/m     He reports that he has never smoked. He has never used smokeless tobacco.  BP Readings from Last 6 Encounters:   09/24/21 118/79   05/12/21 118/78   01/20/20 108/78   10/28/19 116/80     Wt Readings from Last 6 Encounters:   09/24/21 101.4 kg (223 lb 8 oz)   05/12/21 98 kg (216 lb)   01/20/20 97.1 kg (214 lb)   10/28/19 98 kg (216 lb)   01/08/15 103 kg (227 lb)   12/16/14 103 kg (227 lb)       Gen: Alert, no apparent distress.  Heart: Regular rate and rhythm, no murmurs.  Lungs: Clear to auscultation bilaterally, no increased work of breathing.  Abdomen: Soft, non-tender, non-distended, bowel sounds normal.  Extremities: No clubbing, cyanosis, edema  No results found for any visits on 09/24/21.      Assessment & Plan     1. DM2, with hyperglycemia. Check labs. Valentin is going to check his blood sugar preprandial AM and 2 hours after each meal, then see diabetes educator for insulin adjustment.    2. Hypertension. Well-controlled on lisinopril 5 mg. He is going to try a trial of going off of it to see if he still needs medication.  3. Hyperlipidemia. LDL was 29 at last check. I couldn't find evidence of a high LDL on his chart. He's going to try going off of atorvastatin and rechecking cholesterol in 8 weeks.   4. Aspirin - doesn't meet criteria.118      Order Summary                                                      Type 2 diabetes mellitus with hyperglycemia, with long-term current use of insulin (H)  -     Hemoglobin A1c; Future  -     Lipid Profile; Future  -     TSH with free T4 reflex; Future  -     metFORMIN modified (GLUMETZA) 1000 MG 24 hr tablet; Take 2 tablets (2,000 mg) by mouth daily (with breakfast)  -     AMB Adult Diabetes Educator Referral; Future    Essential hypertension  -     Comprehensive metabolic panel (BMP + Alb, Alk Phos, ALT, AST, Total. Bili, TP); Future    Type 2 diabetes mellitus without complication, with long-term current use of insulin (H)  -     insulin aspart (NOVOLOG PEN) 100 UNIT/ML pen; Inject 25 Units Subcutaneous 3 times daily (before meals)    Other orders  -     REVIEW OF HEALTH MAINTENANCE PROTOCOL ORDERS            No future appointments.    Completed by: Nataly Martinez M.D., [facility]. 9/24/2021 10:31 AM.  This transcription uses voice recognition software, which may contain typographical errors.

## 2021-09-24 NOTE — PATIENT INSTRUCTIONS
Diabetes  Refilled Novolog  Will try to get long-acting metformin covered by insurance  Check blood sugar:     Before breakfast    2 hours after start of each meal    High Cholesterol ?  Stop atorvastatin - then recheck cholesterol in 8 weeks to see if you really need it    High Blood Pressure ?  Stop lisinopril - recheck blood pressure at next visit to see if you really need it.

## 2021-09-27 PROBLEM — E66.09 CLASS 1 OBESITY DUE TO EXCESS CALORIES WITH SERIOUS COMORBIDITY AND BODY MASS INDEX (BMI) OF 30.0 TO 30.9 IN ADULT: Status: ACTIVE | Noted: 2021-09-27

## 2021-09-27 PROBLEM — E78.6 LOW HDL (UNDER 40): Status: ACTIVE | Noted: 2021-09-27

## 2021-09-27 PROBLEM — E66.811 CLASS 1 OBESITY DUE TO EXCESS CALORIES WITH SERIOUS COMORBIDITY AND BODY MASS INDEX (BMI) OF 30.0 TO 30.9 IN ADULT: Status: ACTIVE | Noted: 2021-09-27

## 2021-09-28 ENCOUNTER — TELEPHONE (OUTPATIENT)
Dept: FAMILY MEDICINE | Facility: CLINIC | Age: 46
End: 2021-09-28

## 2021-09-28 DIAGNOSIS — E11.65 TYPE 2 DIABETES MELLITUS WITH HYPERGLYCEMIA, WITH LONG-TERM CURRENT USE OF INSULIN (H): Primary | ICD-10-CM

## 2021-09-28 DIAGNOSIS — Z79.4 TYPE 2 DIABETES MELLITUS WITH HYPERGLYCEMIA, WITH LONG-TERM CURRENT USE OF INSULIN (H): Primary | ICD-10-CM

## 2021-09-28 RX ORDER — GLUCOSAMINE HCL/CHONDROITIN SU 500-400 MG
CAPSULE ORAL
Qty: 100 EACH | Refills: 11 | Status: SHIPPED | OUTPATIENT
Start: 2021-09-28

## 2021-09-28 RX ORDER — METFORMIN HYDROCHLORIDE 750 MG/1
1500 TABLET, EXTENDED RELEASE ORAL
Qty: 180 TABLET | Refills: 3 | Status: SHIPPED | OUTPATIENT
Start: 2021-09-28 | End: 2022-02-17

## 2021-09-28 RX ORDER — LANCETS
EACH MISCELLANEOUS
Qty: 100 EACH | Refills: 6 | Status: SHIPPED | OUTPATIENT
Start: 2021-09-28 | End: 2023-01-27

## 2021-09-28 NOTE — TELEPHONE ENCOUNTER
Called and spoke to pt. He is okay with check blood sugars please send glucometer. Pt has not got metformin.    ----- Message from Nataly Martinez MD sent at 9/27/2021  1:21 PM CDT -----  Please let Valentin know:    His diabetes is too high. The A1c should be less than 7 and it's at 9.3. To fix this, he should check his sugars and then see the diabetes educator to go over the numbers so we can adjust his medicine to make it work better for him. If he's interested, we could do a two week continuous glucometer (the freestyle kaitlynn that he's had before). Did his insurance cover the long-acting metformin?    His cholesterol is too low. As we discussed, he should stop his cholesterol medicine until the next visit with me so we can see if he really needs it.     His kidneys and thyroid are healthy.      Future Appointments  11/19/2021 10:00 AM   Nataly Martinez MD         [id number]              [id number]

## 2021-09-28 NOTE — TELEPHONE ENCOUNTER
Meter and metformin sent.  Valentin was seen today for results.    Diagnoses and all orders for this visit:    Type 2 diabetes mellitus with hyperglycemia, with long-term current use of insulin (H)  -     metFORMIN (GLUCOPHAGE-XR) 750 MG 24 hr tablet; Take 2 tablets (1,500 mg) by mouth daily (with dinner)  -     blood glucose monitoring (NO BRAND SPECIFIED) meter device kit; Use to test blood sugar 3 times daily or as directed. Preferred blood glucose meter OR supplies to accompany: Blood Glucose Monitor Brands: per insurance.  -     blood glucose (NO BRAND SPECIFIED) test strip; Use to test blood sugar 3 times daily or as directed. To accompany: Blood Glucose Monitor Brands: per insurance.  -     blood glucose calibration (NO BRAND SPECIFIED) solution; To accompany: Blood Glucose Monitor Brands: per insurance.  -     thin (NO BRAND SPECIFIED) lancets; Use with lanceting device. To accompany: Blood Glucose Monitor Brands: per insurance.  -     alcohol swab prep pads; Use to swab area of injection/keshav as directed.

## 2021-09-29 NOTE — TELEPHONE ENCOUNTER
Medication has been changed to metFORMIN (GLUCOPHAGE-XR) 750 MG 24 hr tablet, which is covered without authorization.

## 2021-10-22 ENCOUNTER — OFFICE VISIT (OUTPATIENT)
Dept: EDUCATION SERVICES | Facility: CLINIC | Age: 46
End: 2021-10-22
Payer: COMMERCIAL

## 2021-10-22 VITALS — WEIGHT: 225 LBS | BODY MASS INDEX: 30.94 KG/M2

## 2021-10-22 DIAGNOSIS — Z79.4 TYPE 2 DIABETES MELLITUS WITH HYPERGLYCEMIA, WITH LONG-TERM CURRENT USE OF INSULIN (H): ICD-10-CM

## 2021-10-22 DIAGNOSIS — E11.65 TYPE 2 DIABETES MELLITUS WITH HYPERGLYCEMIA, WITH LONG-TERM CURRENT USE OF INSULIN (H): ICD-10-CM

## 2021-10-22 PROCEDURE — G0108 DIAB MANAGE TRN  PER INDIV: HCPCS | Mod: AE | Performed by: FAMILY MEDICINE

## 2021-10-22 NOTE — PROGRESS NOTES
"Diabetes Self-Management Education & Support    Presents for: Follow-up    SUBJECTIVE/OBJECTIVE:  Presents for: Follow-up  Accompanied by: Self  Diabetes education in the past 24mo: No  Focus of Visit: Monitoring, Reducing Risks, Taking Medication, Healthy Eating, Being Active  Diabetes type: Type 2  Disease course: Worsening  Transportation concerns: No  Difficulty affording diabetes medication?: No  Difficulty affording diabetes testing supplies?: No  Cultural Influences/Ethnic Background:  Not  or       Diabetes Symptoms & Complications:  Fatigue: No  Neuropathy: No  Polydipsia: No  Polyphagia: No  Polyuria: No  Visual change: No  Slow healing wounds: No  Weight trend: Decreasing       Patient Problem List and Family Medical History reviewed for relevant medical history, current medical status, and diabetes risk factors.    Vitals:  Wt 102.1 kg (225 lb)   BMI 30.94 kg/m    Estimated body mass index is 30.94 kg/m  as calculated from the following:    Height as of 5/12/21: 1.816 m (5' 11.5\").    Weight as of this encounter: 102.1 kg (225 lb).   Last 3 BP:   BP Readings from Last 3 Encounters:   09/24/21 118/79   05/12/21 118/78   01/20/20 108/78       History   Smoking Status     Never Smoker   Smokeless Tobacco     Never Used       Labs:  Lab Results   Component Value Date    A1C 9.3 09/24/2021     Lab Results   Component Value Date     09/24/2021     Lab Results   Component Value Date    LDL 23 09/24/2021    LDL 56 06/06/2014     Direct Measure HDL   Date Value Ref Range Status   09/24/2021 35 (L) >=40 mg/dL Final     Comment:     HDL Cholesterol Reference Range:     0-2 years:   No reference ranges established for patients under 2 years old  at [facility] for lipid analytes.    2-8 years:  Greater than 45 mg/dL     18 years and older:   Female: Greater than or equal to 50 mg/dL   Male:   Greater than or equal to 40 mg/dL   ]  GFR Estimate   Date Value Ref Range Status " "  09/24/2021 85 >60 mL/min/1.73m2 Final     Comment:     As of July 11, 2021, eGFR is calculated by the CKD-EPI creatinine equation, without race adjustment. eGFR can be influenced by muscle mass, exercise, and diet. The reported eGFR is an estimation only and is only applicable if the renal function is stable.   05/12/2021 >60 >60 mL/min/1.73m2 Final     GFR Estimate If Black   Date Value Ref Range Status   05/12/2021 >60 >60 mL/min/1.73m2 Final     Lab Results   Component Value Date    CR 1.04 09/24/2021     No results found for: MICROALBUMIN    Healthy Eating:  Healthy Eating Assessed Today: Yes  Meal planning/habits: None  How many times a week on average do you eat food made away from home (restaurant/take-out)?: 5+  Meals include: Breakfast, Dinner (It was difficult to obtain specifics about what patient has with his meals: he reported that \"it depended on what he was feeling like\")  Breakfast: on the run before work: maybe a breakfast sandwich from [employer]  Lunch: usually just a snack like chips  Dinner: at home sometimes: steak or canned soup or out to eat, no specifics given on what he would consume  Beverages: Water, Diet soda    Being Active:  Being Active Assessed Today: Yes  Exercise:: Yes (patient reported that he walked up/down the block after work, 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 : No  Blood Glucose Meter: Unknown  Times checking blood sugar at home (number): 2  Times checking blood sugar at home (per): Day    Blood glucose record:  Patient did not bring his meter to the appointment, BG are per recall:  FBS: 150-200  Pre dinner: 200's      Taking Medications:  Diabetes Medication(s)     Biguanides       metFORMIN (GLUCOPHAGE-XR) 750 MG 24 hr tablet    Take 2 tablets (1,500 mg) by mouth daily (with dinner)    Insulin       insulin glargine (LANTUS PEN) " 100 UNIT/ML pen    Inject 47 Units Subcutaneous At Bedtime     insulin aspart (NOVOLOG PEN) 100 UNIT/ML pen    Inject 25 Units Subcutaneous 3 times daily (before meals)          Taking Medication Assessed Today: Yes  Current Treatments: Insulin Injections, Oral Medication (taken by mouth)  Dose schedule: Pre-breakfast, Pre-dinner, At bedtime  Given by: Patient  Injection/Infusion sites: Arms, Thighs (reviewed injection sites: injecting into fatty tissue, not muscle tissue)    Problem Solving:  Is the patient at risk for hypoglycemia?: Yes (patient is at risk due to being on insulin,)  Hypoglycemia Frequency: Other (patient did not recall having s/s of hypoglycemia)              Reducing Risks:  Has dilated eye exam at least once a year?: Yes (patient reported exam: summer 2021)  Sees dentist every 6 months?: Yes (patient reported exam: summer 2021)    Healthy Coping:  Emotional response to diabetes: Uncertain  Patient Activation Measure Survey Score:  No flowsheet data found.    Diabetes knowledge and skills assessment:   Patient is knowledgeable in diabetes management concepts related to: all topics reviewed today: review needed    Patient needs further education on the following diabetes management concepts: Healthy Eating, Monitoring and Taking Medication    Based on learning assessment above, most appropriate setting for further diabetes education would be: Individual setting.      INTERVENTIONS:    Education provided today on:  AADE Self-Care Behaviors:  Healthy Eating: consistency in amount, composition, and timing of food intake  Being Active: describe appropriate activity program  Monitoring: log and interpret results, individual blood glucose targets and frequency of monitoring  Taking Medication: action of prescribed medication, drawing up, administering and storing injectable diabetes medications, proper site selection and rotation for injections and when to take medications  Problem Solving: high blood  glucose - causes, signs/symptoms, treatment and prevention, low blood glucose - causes, signs/symptoms, treatment and prevention and carrying a carbohydrate source at all times  Reducing Risks: foot care, appropriate dental care, annual eye exam, lipids levels and goals and blood pressure and goals    Opportunities for ongoing education and support in diabetes-self management were discussed.    Pt verbalized understanding of concepts discussed and recommendations provided today.       Education Materials Provided:  Living Healthy with Diabetes      ASSESSMENT:  Follow up visit for Valentin today.  Last DM education in 20115. He does not report any s/s of hyper or hypoglycemia. 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  He is having two main meals/day: am: breakfast sandwich from [employer]. PM: steak/canned soup at home, or out to eat, no specifics shared.  He is hydrating with water, diet soda. Patient is taking meal time insulin with am and pm meal.  He does not bring his insulin or meter to work.         Patient's most recent   Lab Results   Component Value Date    A1C 9.3 09/24/2021    is not meeting goal of <7.0    PLAN  1. Per CDE protocol increase lantus to 47 units.  2. Administer humalog 15 minutes prior to meals.  3. Carry glucose tablets to treat s/s of hypoglycemia.  4. Check fasting and two hour post dinner glucose daily: record in logbook.   5. Follow up with educator on 11/5/21    See Patient Instructions for co-developed, patient-stated behavior change goals.  AVS printed and provided to patient today. See Follow-Up section for recommended follow-up.      Time Spent: 30 minutes  Encounter Type: Individual    Any diabetes medication dose changes were made via the CDE Protocol and Collaborative Practice Agreement with the patient's primary care provider. A copy of this encounter was shared with the provider.

## 2021-10-22 NOTE — LETTER
"    10/22/2021         RE: Valentin Cartagena  [address] [place] [address]        Dear Colleague,    Thank you for referring your patient, Valentin Cartagena, to the [facility]. Please see a copy of my visit note below.    Diabetes Self-Management Education & Support    Presents for: Follow-up    SUBJECTIVE/OBJECTIVE:  Presents for: Follow-up  Accompanied by: Self  Diabetes education in the past 24mo: No  Focus of Visit: Monitoring, Reducing Risks, Taking Medication, Healthy Eating, Being Active  Diabetes type: Type 2  Disease course: Worsening  Transportation concerns: No  Difficulty affording diabetes medication?: No  Difficulty affording diabetes testing supplies?: No  Cultural Influences/Ethnic Background:  Not  or       Diabetes Symptoms & Complications:  Fatigue: No  Neuropathy: No  Polydipsia: No  Polyphagia: No  Polyuria: No  Visual change: No  Slow healing wounds: No  Weight trend: Decreasing       Patient Problem List and Family Medical History reviewed for relevant medical history, current medical status, and diabetes risk factors.    Vitals:  Wt 102.1 kg (225 lb)   BMI 30.94 kg/m    Estimated body mass index is 30.94 kg/m  as calculated from the following:    Height as of 5/12/21: 1.816 m (5' 11.5\").    Weight as of this encounter: 102.1 kg (225 lb).   Last 3 BP:   BP Readings from Last 3 Encounters:   09/24/21 118/79   05/12/21 118/78   01/20/20 108/78       History   Smoking Status     Never Smoker   Smokeless Tobacco     Never Used       Labs:  Lab Results   Component Value Date    A1C 9.3 09/24/2021     Lab Results   Component Value Date     09/24/2021     Lab Results   Component Value Date    LDL 23 09/24/2021    LDL 56 06/06/2014     Direct Measure HDL   Date Value Ref Range Status   09/24/2021 35 (L) >=40 mg/dL Final     Comment:     HDL Cholesterol Reference Range:     0-2 years:   No reference ranges established for patients under 2 years old  at " "[facility] for lipid analytes.    2-8 years:  Greater than 45 mg/dL     18 years and older:   Female: Greater than or equal to 50 mg/dL   Male:   Greater than or equal to 40 mg/dL   ]  GFR Estimate   Date Value Ref Range Status   09/24/2021 85 >60 mL/min/1.73m2 Final     Comment:     As of July 11, 2021, eGFR is calculated by the CKD-EPI creatinine equation, without race adjustment. eGFR can be influenced by muscle mass, exercise, and diet. The reported eGFR is an estimation only and is only applicable if the renal function is stable.   05/12/2021 >60 >60 mL/min/1.73m2 Final     GFR Estimate If Black   Date Value Ref Range Status   05/12/2021 >60 >60 mL/min/1.73m2 Final     Lab Results   Component Value Date    CR 1.04 09/24/2021     No results found for: MICROALBUMIN    Healthy Eating:  Healthy Eating Assessed Today: Yes  Meal planning/habits: None  How many times a week on average do you eat food made away from home (restaurant/take-out)?: 5+  Meals include: Breakfast, Dinner (It was difficult to obtain specifics about what patient has with his meals: he reported that \"it depended on what he was feeling like\")  Breakfast: on the run before work: maybe a breakfast sandwich from [employer]  Lunch: usually just a snack like chips  Dinner: at home sometimes: steak or canned soup or out to eat, no specifics given on what he would consume  Beverages: Water, Diet soda    Being Active:  Being Active Assessed Today: Yes  Exercise:: Yes (patient reported that he walked up/down the block after work, 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 : No  Blood Glucose Meter: Unknown  Times checking blood sugar at home (number): 2  Times checking blood sugar at home (per): Day    Blood glucose record:  Patient did not bring his meter to the appointment, BG are per recall:  FBS: " 150-200  Pre dinner: 200's      Taking Medications:  Diabetes Medication(s)     Biguanides       metFORMIN (GLUCOPHAGE-XR) 750 MG 24 hr tablet    Take 2 tablets (1,500 mg) by mouth daily (with dinner)    Insulin       insulin glargine (LANTUS PEN) 100 UNIT/ML pen    Inject 47 Units Subcutaneous At Bedtime     insulin aspart (NOVOLOG PEN) 100 UNIT/ML pen    Inject 25 Units Subcutaneous 3 times daily (before meals)          Taking Medication Assessed Today: Yes  Current Treatments: Insulin Injections, Oral Medication (taken by mouth)  Dose schedule: Pre-breakfast, Pre-dinner, At bedtime  Given by: Patient  Injection/Infusion sites: Arms, Thighs (reviewed injection sites: injecting into fatty tissue, not muscle tissue)    Problem Solving:  Is the patient at risk for hypoglycemia?: Yes (patient is at risk due to being on insulin,)  Hypoglycemia Frequency: Other (patient did not recall having s/s of hypoglycemia)              Reducing Risks:  Has dilated eye exam at least once a year?: Yes (patient reported exam: summer 2021)  Sees dentist every 6 months?: Yes (patient reported exam: summer 2021)    Healthy Coping:  Emotional response to diabetes: Uncertain  Patient Activation Measure Survey Score:  No flowsheet data found.    Diabetes knowledge and skills assessment:   Patient is knowledgeable in diabetes management concepts related to: all topics reviewed today: review needed    Patient needs further education on the following diabetes management concepts: Healthy Eating, Monitoring and Taking Medication    Based on learning assessment above, most appropriate setting for further diabetes education would be: Individual setting.      INTERVENTIONS:    Education provided today on:  AADE Self-Care Behaviors:  Healthy Eating: consistency in amount, composition, and timing of food intake  Being Active: describe appropriate activity program  Monitoring: log and interpret results, individual blood glucose targets and frequency  of monitoring  Taking Medication: action of prescribed medication, drawing up, administering and storing injectable diabetes medications, proper site selection and rotation for injections and when to take medications  Problem Solving: high blood glucose - causes, signs/symptoms, treatment and prevention, low blood glucose - causes, signs/symptoms, treatment and prevention and carrying a carbohydrate source at all times  Reducing Risks: foot care, appropriate dental care, annual eye exam, lipids levels and goals and blood pressure and goals    Opportunities for ongoing education and support in diabetes-self management were discussed.    Pt verbalized understanding of concepts discussed and recommendations provided today.       Education Materials Provided:  Living Healthy with Diabetes      ASSESSMENT:  Follow up visit for Valentin today.  Last DM education in 20115. He does not report any s/s of hyper or hypoglycemia. 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  He is having two main meals/day: am: breakfast sandwich from [employer]. PM: steak/canned soup at home, or out to eat, no specifics shared.  He is hydrating with water, diet soda. Patient is taking meal time insulin with am and pm meal.  He does not bring his insulin or meter to work.         Patient's most recent   Lab Results   Component Value Date    A1C 9.3 09/24/2021    is not meeting goal of <7.0    PLAN  1. Per CDE protocol increase lantus to 47 units.  2. Administer humalog 15 minutes prior to meals.  3. Carry glucose tablets to treat s/s of hypoglycemia.  4. Check fasting and two hour post dinner glucose daily: record in logbook.   5. Follow up with educator on 11/5/21    See Patient Instructions for co-developed, patient-stated behavior change goals.  AVS printed and provided to patient today. See Follow-Up section for recommended follow-up.      Time Spent: 30 minutes  Encounter Type: Individual    Any diabetes  medication dose changes were made via the CDE Protocol and Collaborative Practice Agreement with the patient's primary care provider. A copy of this encounter was shared with the provider.

## 2021-11-05 ENCOUNTER — TELEPHONE (OUTPATIENT)
Dept: EDUCATION SERVICES | Facility: CLINIC | Age: 46
End: 2021-11-05

## 2021-11-05 ENCOUNTER — VIRTUAL VISIT (OUTPATIENT)
Dept: EDUCATION SERVICES | Facility: CLINIC | Age: 46
End: 2021-11-05
Payer: COMMERCIAL

## 2021-11-05 DIAGNOSIS — E11.65 TYPE 2 DIABETES MELLITUS WITH HYPERGLYCEMIA, WITH LONG-TERM CURRENT USE OF INSULIN (H): ICD-10-CM

## 2021-11-05 DIAGNOSIS — Z79.4 TYPE 2 DIABETES MELLITUS WITHOUT COMPLICATION, WITH LONG-TERM CURRENT USE OF INSULIN (H): ICD-10-CM

## 2021-11-05 DIAGNOSIS — E11.9 TYPE 2 DIABETES MELLITUS WITHOUT COMPLICATION, WITH LONG-TERM CURRENT USE OF INSULIN (H): ICD-10-CM

## 2021-11-05 DIAGNOSIS — Z79.4 TYPE 2 DIABETES MELLITUS WITH HYPERGLYCEMIA, WITH LONG-TERM CURRENT USE OF INSULIN (H): ICD-10-CM

## 2021-11-05 PROCEDURE — 98967 PH1 ASSMT&MGMT NQHP 11-20: CPT | Performed by: DIETITIAN, REGISTERED

## 2021-11-05 NOTE — TELEPHONE ENCOUNTER
I called and spoke with Valentin about the new medication that Dr. Martinez ordered for his Diabetes Management: Jardiance 10 mg daily.  I encouraged Valentin to be drinking plenty of water when taking this medication.  I also discussed with Valentin that if he notices two BG readings < 70 mg/dl in one week after starting the medication, that he should call into Dr. Martinez or the Diabetes Care team to let us know, so we can adjust insulin if needed. Patient has a follow up scheduled with Dr. Martinez on 11/19/21.  I reminded him to bring his meter for review.    Raquel Garay RD, [facility]

## 2021-11-05 NOTE — PATIENT INSTRUCTIONS
Recommend increase to insulin -   Lantus to 50 units    Novolog to 27 units, 15 minutes prior to his two meals/day    Follow up with PCP on 11/19/21, bring meter for review    Test glucose: fasting and two hours after meals.    Purchase and carry glucose tablets: treat < 70 mg/dl BG with four tablets, retest in 15 minutes, if BG still < 70 mg/dl, repeat steps.

## 2021-11-05 NOTE — PROGRESS NOTES
BMI Readings from Last 3 Encounters:   10/22/21 30.94 kg/m    09/24/21 30.74 kg/m    05/12/21 29.71 kg/m

## 2021-11-05 NOTE — LETTER
11/5/2021         RE: Valentin Cartagena  [address] [place] [address]        Dear Colleague,    Thank you for referring your patient, Valentin Cartagena, to the [facility]. Please see a copy of my visit note below.    Type of Service: Telephone Visit/ 15 minutes    How would patient like to obtain AVS? Mail a copy     Diabetes Follow-up    Subjective/Objective:    Today was a review of BG log for Valentin Cartagena. Last date of communication was: 10/22/2021.    Diabetes is being managed with    Diabetes Medications   Diabetes Medication(s)     Biguanides       metFORMIN (GLUCOPHAGE-XR) 750 MG 24 hr tablet    Take 2 tablets (1,500 mg) by mouth daily (with dinner)    Insulin       insulin aspart (NOVOLOG PEN) 100 UNIT/ML pen    Inject 27 Units Subcutaneous 2 times daily (with meals)     insulin glargine (LANTUS PEN) 100 UNIT/ML pen    Inject 50 Units Subcutaneous At Bedtime          BG/Food Log:   FBS: 154,153,197,228,267,219,221,,301    Post lunch: 219,220,168,239,245,236,59*,176,    Post dinner: 218,150,206,199,274,213,297  * patient reported no s/s of hypoglycemia, low BG was not treated.     Assessment:    Fasting blood glucose: 0% in target.  After lunch glucose: 13% in target..  After dinner glucose: 13% in target.      Plan/Response:  See Patient Instructions for co-developed, patient-stated behavior change goals.  Check blood sugars fasting and 2 hours after the start of meals ( )    Recommend increase to insulin -   Lantus to 50 units    Novolog to 27 units, 15 minutes prior to his two meals/day    Follow up with PCP on 11/19/21, bring meter for review    Test glucose: fasting and two hours after meals.    Purchase and carry glucose tablets: treat < 70 mg/dl BG with four tablets, retest in 15 minutes, if BG still < 70 mg/dl, repeat steps.         Any diabetes medication dose changes were made via the CDE Protocol and Collaborative Practice Agreement with the patient's primary care  provider. A copy of this encounter was shared with the provider.

## 2021-11-05 NOTE — Clinical Note
Dr. Martinez,  I had a follow up with Valentin today.  His FBS remain > 130 mg/dl and post prandial remain> 180 mg/dl.  Per CDE protocol I had him increase his Lantus to 50 units and his Novolog to 27 units with his two meals.  Per your discretion we could increase his Metformin to 2000 mg, he is currently on 1500 mg/day. If feasible we could look at adding a GLP1 or SGLT2 versus continued insulin increases, per your discretion. He is scheduled to see you on 11/19/21. I asked him to bring his meter to the appointment for you to review his BG.     Thank you,  Raquel Garay

## 2021-11-05 NOTE — PROGRESS NOTES
Type of Service: Telephone Visit/ 15 minutes    How would patient like to obtain AVS? Mail a copy     Diabetes Follow-up    Subjective/Objective:    Today was a review of BG log for Valentin Cartagena. Last date of communication was: 10/22/2021.    Diabetes is being managed with    Diabetes Medications   Diabetes Medication(s)     Biguanides       metFORMIN (GLUCOPHAGE-XR) 750 MG 24 hr tablet    Take 2 tablets (1,500 mg) by mouth daily (with dinner)    Insulin       insulin aspart (NOVOLOG PEN) 100 UNIT/ML pen    Inject 27 Units Subcutaneous 2 times daily (with meals)     insulin glargine (LANTUS PEN) 100 UNIT/ML pen    Inject 50 Units Subcutaneous At Bedtime          BG/Food Log:   FBS: 154,153,197,228,267,219,221,,301    Post lunch: 219,220,168,239,245,236,59*,176,    Post dinner: 218,150,206,199,274,213,297  * patient reported no s/s of hypoglycemia, low BG was not treated.     Assessment:    Fasting blood glucose: 0% in target.  After lunch glucose: 13% in target..  After dinner glucose: 13% in target.      Plan/Response:  See Patient Instructions for co-developed, patient-stated behavior change goals.  Check blood sugars fasting and 2 hours after the start of meals ( )    Recommend increase to insulin -   Lantus to 50 units    Novolog to 27 units, 15 minutes prior to his two meals/day    Follow up with PCP on 11/19/21, bring meter for review    Test glucose: fasting and two hours after meals.    Purchase and carry glucose tablets: treat < 70 mg/dl BG with four tablets, retest in 15 minutes, if BG still < 70 mg/dl, repeat steps.         Any diabetes medication dose changes were made via the CDE Protocol and Collaborative Practice Agreement with the patient's primary care provider. A copy of this encounter was shared with the provider.

## 2021-11-19 ENCOUNTER — OFFICE VISIT (OUTPATIENT)
Dept: FAMILY MEDICINE | Facility: CLINIC | Age: 46
End: 2021-11-19
Payer: COMMERCIAL

## 2021-11-19 VITALS
OXYGEN SATURATION: 99 % | DIASTOLIC BLOOD PRESSURE: 76 MMHG | WEIGHT: 223 LBS | SYSTOLIC BLOOD PRESSURE: 110 MMHG | HEART RATE: 89 BPM | RESPIRATION RATE: 16 BRPM | BODY MASS INDEX: 30.67 KG/M2

## 2021-11-19 DIAGNOSIS — Z79.4 TYPE 2 DIABETES MELLITUS WITH HYPERGLYCEMIA, WITH LONG-TERM CURRENT USE OF INSULIN (H): ICD-10-CM

## 2021-11-19 DIAGNOSIS — E11.65 TYPE 2 DIABETES MELLITUS WITH HYPERGLYCEMIA, WITH LONG-TERM CURRENT USE OF INSULIN (H): ICD-10-CM

## 2021-11-19 PROCEDURE — 99213 OFFICE O/P EST LOW 20 MIN: CPT | Performed by: FAMILY MEDICINE

## 2021-11-19 NOTE — PROGRESS NOTES
Office Visit  [facility]  Date of Service: Nov 19, 2021      Subjective   Valentin Cartagena is a 46 year old male who presents for   Chief Complaint   Patient presents with     Diabetes     Reviewed labs from previous visit:    A1c 9.3% - was taking novolog 25-30 units with meals and lantus 45 every night.  He has added empagliflozin and blood sugars are looking considerably better over the last month.    Very low cholesterol - total 75, LDL 23, HDL 35  Normal thyroid, kidney and liver.    Declines covid shot.  Answers for HPI/ROS submitted by the patient on 11/19/2021  Frequency of checking blood sugars:: two times daily  What time of day are you checking your blood sugars : before meals  Have you had any blood sugars above 200?: Yes  Have you had any blood sugars below 70?: No  Hypoglycemia symptoms:: none  Diabetic concerns:: none  Paraesthesia present:: none of these symptoms  How many servings of fruits and vegetables do you eat daily?: 2-3  On average, how many sweetened beverages do you drink each day (Examples: soda, juice, sweet tea, etc.  Do NOT count diet or artificially sweetened beverages)?: 1  How many minutes a day do you exercise enough to make your heart beat faster?: 9 or less  How many days a week do you exercise enough to make your heart beat faster?: 7  How many days per week do you miss taking your medication?: 0    Objective   /76 (BP Location: Left arm, Patient Position: Sitting, Cuff Size: Adult Large)   Pulse 89   Resp 16   Wt 101.2 kg (223 lb)   SpO2 99%   BMI 30.67 kg/m     He reports that he has never smoked. He has never used smokeless tobacco.  Wt Readings from Last 3 Encounters:   11/19/21 101.2 kg (223 lb)   10/22/21 102.1 kg (225 lb)   09/24/21 101.4 kg (223 lb 8 oz)   '  BP Readings from Last 3 Encounters:   11/19/21 110/76   09/24/21 118/79   05/12/21 118/78       Gen: Alert, no apparent distress.  Heart: Regular rate and rhythm, no  murmurs.  Lungs: Clear to auscultation bilaterally, no increased work of breathing.  Abdomen: Soft, non-tender, non-distended, bowel sounds normal.  Extremities: No clubbing, cyanosis, edema    Office Visit on 09/24/2021   Component Date Value Ref Range Status     Hemoglobin A1C 09/24/2021 9.3* 0.0 - 5.6 % Final    Normal <5.7%   Prediabetes 5.7-6.4%    Diabetes 6.5% or higher     Note: Adopted from ADA consensus guidelines.     Cholesterol 09/24/2021 75  <=199 mg/dL Final     Triglycerides 09/24/2021 83  <=149 mg/dL Final     Direct Measure HDL 09/24/2021 35* >=40 mg/dL Final    HDL Cholesterol Reference Range:     0-2 years:   No reference ranges established for patients under 2 years old  at [facility] for lipid analytes.    2-8 years:  Greater than 45 mg/dL     18 years and older:   Female: Greater than or equal to 50 mg/dL   Male:   Greater than or equal to 40 mg/dL     LDL Cholesterol Calculated 09/24/2021 23  <=129 mg/dL Final     Patient Fasting > 8hrs? 09/24/2021 Unknown   Final     Sodium 09/24/2021 140  136 - 145 mmol/L Final     Potassium 09/24/2021 4.4  3.5 - 5.0 mmol/L Final     Chloride 09/24/2021 105  98 - 107 mmol/L Final     Carbon Dioxide (CO2) 09/24/2021 24  22 - 31 mmol/L Final     Anion Gap 09/24/2021 11  5 - 18 mmol/L Final     Urea Nitrogen 09/24/2021 11  8 - 22 mg/dL Final     Creatinine 09/24/2021 1.04  0.70 - 1.30 mg/dL Final     Calcium 09/24/2021 9.7  8.5 - 10.5 mg/dL Final     Glucose 09/24/2021 276* 70 - 125 mg/dL Final     Alkaline Phosphatase 09/24/2021 77  45 - 120 U/L Final     AST 09/24/2021 17  0 - 40 U/L Final     ALT 09/24/2021 24  0 - 45 U/L Final     Protein Total 09/24/2021 7.4  6.0 - 8.0 g/dL Final     Albumin 09/24/2021 3.7  3.5 - 5.0 g/dL Final     Bilirubin Total 09/24/2021 0.5  0.0 - 1.0 mg/dL Final     GFR Estimate 09/24/2021 85  >60 mL/min/1.73m2 Final    As of July 11, 2021, eGFR is calculated by the CKD-EPI creatinine equation, without race adjustment.  eGFR can be influenced by muscle mass, exercise, and diet. The reported eGFR is an estimation only and is only applicable if the renal function is stable.     TSH 09/24/2021 0.51  0.30 - 5.00 uIU/mL Final             Assessment & Plan     1. Type 2 diabetes, insulin dependent, with hyperglycemia. Blood sugars considerably better on empagliflozin. Still has mild postprandial hyperglycemia - increase novolog to 27 units with meals, continue glargine 47 units at bedtime, empagliflozin 10 mg and metformin ER 1500 mg with supper. Recheck A1c in 2-3 months.  2. Strongly encouraged covid immunization. Valentin declines at this time, but we discussed that I really would encourage him to get it because he has multiple risk factors for severe disease and we're having a surge in [place].  3. Hypertension - well controlled.  4. Hyperlipidemia - very low cholesterol. Held statin - recheck lipids at next visit.      Order Summary                                                      Type 2 diabetes mellitus with hyperglycemia, with long-term current use of insulin (H)  -     insulin glargine (LANTUS PEN) 100 UNIT/ML pen; Inject 47 Units Subcutaneous At Bedtime  -     insulin aspart (NOVOLOG PEN) 100 UNIT/ML pen; Inject 27 Units Subcutaneous 2 times daily (with meals)            No future appointments.    Completed by: Nataly Martinez M.D., [facility]. 11/19/2021 10:12 AM.  This transcription uses voice recognition software, which may contain typographical errors.

## 2022-02-08 DIAGNOSIS — E11.9 TYPE 2 DIABETES MELLITUS WITHOUT COMPLICATION, WITH LONG-TERM CURRENT USE OF INSULIN (H): Primary | ICD-10-CM

## 2022-02-08 DIAGNOSIS — Z79.4 TYPE 2 DIABETES MELLITUS WITHOUT COMPLICATION, WITH LONG-TERM CURRENT USE OF INSULIN (H): Primary | ICD-10-CM

## 2022-02-09 RX ORDER — LISINOPRIL 5 MG/1
5 TABLET ORAL DAILY
Qty: 90 TABLET | Refills: 2 | Status: SHIPPED | OUTPATIENT
Start: 2022-02-09 | End: 2023-05-22

## 2022-02-09 NOTE — TELEPHONE ENCOUNTER
"  Outpatient Medication Detail     Disp Refills Start End SUKI   lisinopriL (PRINIVIL,ZESTRIL) 5 MG tablet 90 tablet 1 5/12/2021  No   Sig - Route: Take 1 tablet (5 mg total) by mouth daily. - Oral   Sent to pharmacy as: lisinopriL 5 mg tablet (PRINIVIL,ZESTRIL)   E-Prescribing Status: Receipt confirmed by pharmacy (5/12/2021 10:04 AM CDT)       Last office visit provider:  11/19/21     Requested Prescriptions   Pending Prescriptions Disp Refills     lisinopril (ZESTRIL) 5 MG tablet 90 tablet 1     Sig: Take 1 tablet (5 mg) by mouth daily       ACE Inhibitors (Including Combos) Protocol Failed - 2/8/2022  8:35 AM        Failed - Medication is active on med list        Passed - Blood pressure under 140/90 in past 12 months     BP Readings from Last 3 Encounters:   11/19/21 110/76   09/24/21 118/79   05/12/21 118/78                 Passed - Recent (12 mo) or future (30 days) visit within the authorizing provider's specialty     Patient has had an office visit with the authorizing provider or a provider within the authorizing providers department within the previous 12 mos or has a future within next 30 days. See \"Patient Info\" tab in inbasket, or \"Choose Columns\" in Meds & Orders section of the refill encounter.              Passed - Patient is age 18 or older        Passed - Normal serum creatinine on file in past 12 months     Recent Labs   Lab Test 09/24/21  1101   CR 1.04       Ok to refill medication if creatinine is low          Passed - Normal serum potassium on file in past 12 months     Recent Labs   Lab Test 09/24/21  1101   POTASSIUM 4.4                  Marco Seymour RN 02/09/22 3:28 PM  "

## 2022-02-17 ENCOUNTER — VIRTUAL VISIT (OUTPATIENT)
Dept: FAMILY MEDICINE | Facility: CLINIC | Age: 47
End: 2022-02-17
Payer: COMMERCIAL

## 2022-02-17 DIAGNOSIS — Z12.11 COLON CANCER SCREENING: ICD-10-CM

## 2022-02-17 DIAGNOSIS — E11.65 TYPE 2 DIABETES MELLITUS WITH HYPERGLYCEMIA, WITH LONG-TERM CURRENT USE OF INSULIN (H): Primary | ICD-10-CM

## 2022-02-17 DIAGNOSIS — Z79.4 TYPE 2 DIABETES MELLITUS WITH HYPERGLYCEMIA, WITH LONG-TERM CURRENT USE OF INSULIN (H): Primary | ICD-10-CM

## 2022-02-17 PROCEDURE — 99213 OFFICE O/P EST LOW 20 MIN: CPT | Mod: 95 | Performed by: FAMILY MEDICINE

## 2022-02-17 RX ORDER — ATORVASTATIN CALCIUM 40 MG/1
TABLET, FILM COATED ORAL
COMMUNITY
Start: 2021-10-13 | End: 2022-07-06

## 2022-02-17 RX ORDER — METFORMIN HYDROCHLORIDE 750 MG/1
750 TABLET, EXTENDED RELEASE ORAL
Qty: 90 TABLET | Refills: 3 | Status: SHIPPED | OUTPATIENT
Start: 2022-02-17 | End: 2022-12-05

## 2022-02-17 NOTE — PROGRESS NOTES
Valentin is a 46 year old who is being evaluated via a billable telephone visit.      What phone number would you like to be contacted at? [phone]  How would you like to obtain your AVS? MyChart  ____________________________    Virtual Visit - Telephone Encounter  [facility]  Date of Service: 2/17/2022    Subjective:  Chief Complaint   Patient presents with     Diabetes      Diabetes  Doing real good  Sugars are all under 200 - under 150   Sometimes before or after meals  Doesn't like taking two metformin pills at a time, has been taking one every evening.   OK with the empagliflozin, they aren't as big.     Exercise  Likes to walk - too cold now    Diet  Eats salads here and there      Objective:     Wt Readings from Last 6 Encounters:   11/19/21 101.2 kg (223 lb)   10/22/21 102.1 kg (225 lb)   09/24/21 101.4 kg (223 lb 8 oz)   05/12/21 98 kg (216 lb)   01/20/20 97.1 kg (214 lb)   10/28/19 98 kg (216 lb)     Speech is normal  Patient is calm  No visits with results within 1 Week(s) from this visit.   Latest known visit with results is:   Office Visit on 09/24/2021   Component Date Value Ref Range Status     Hemoglobin A1C 09/24/2021 9.3 (A) 0.0 - 5.6 % Final    Normal <5.7%   Prediabetes 5.7-6.4%    Diabetes 6.5% or higher     Note: Adopted from ADA consensus guidelines.     Cholesterol 09/24/2021 75  <=199 mg/dL Final     Triglycerides 09/24/2021 83  <=149 mg/dL Final     Direct Measure HDL 09/24/2021 35 (A) >=40 mg/dL Final    HDL Cholesterol Reference Range:     0-2 years:   No reference ranges established for patients under 2 years old  at [facility] for lipid analytes.    2-8 years:  Greater than 45 mg/dL     18 years and older:   Female: Greater than or equal to 50 mg/dL   Male:   Greater than or equal to 40 mg/dL     LDL Cholesterol Calculated 09/24/2021 23  <=129 mg/dL Final     Patient Fasting > 8hrs? 09/24/2021 Unknown   Final     Sodium  09/24/2021 140  136 - 145 mmol/L Final     Potassium 09/24/2021 4.4  3.5 - 5.0 mmol/L Final     Chloride 09/24/2021 105  98 - 107 mmol/L Final     Carbon Dioxide (CO2) 09/24/2021 24  22 - 31 mmol/L Final     Anion Gap 09/24/2021 11  5 - 18 mmol/L Final     Urea Nitrogen 09/24/2021 11  8 - 22 mg/dL Final     Creatinine 09/24/2021 1.04  0.70 - 1.30 mg/dL Final     Calcium 09/24/2021 9.7  8.5 - 10.5 mg/dL Final     Glucose 09/24/2021 276 (A) 70 - 125 mg/dL Final     Alkaline Phosphatase 09/24/2021 77  45 - 120 U/L Final     AST 09/24/2021 17  0 - 40 U/L Final     ALT 09/24/2021 24  0 - 45 U/L Final     Protein Total 09/24/2021 7.4  6.0 - 8.0 g/dL Final     Albumin 09/24/2021 3.7  3.5 - 5.0 g/dL Final     Bilirubin Total 09/24/2021 0.5  0.0 - 1.0 mg/dL Final     GFR Estimate 09/24/2021 85  >60 mL/min/1.73m2 Final    As of July 11, 2021, eGFR is calculated by the CKD-EPI creatinine equation, without race adjustment. eGFR can be influenced by muscle mass, exercise, and diet. The reported eGFR is an estimation only and is only applicable if the renal function is stable.     TSH 09/24/2021 0.51  0.30 - 5.00 uIU/mL Final     No results found.   Assessment & Plan:    Type 2 diabetes with hyperglycemia, chronic insulin use. No retinopathy, nephropathy, neuropathy. Blood sugars are probably a little high. He will be coming in for labs tomorrow. Will reduce metformin to one pill in the evening now.    Colon cancer screening - discussed recommendation for colonoscopy and options for colon cancer screening.     Order Summary    ICD-10-CM    1. Type 2 diabetes mellitus with hyperglycemia, with long-term current use of insulin (H)  E11.65 Hemoglobin A1c    Z79.4 Lipid Profile     Comprehensive metabolic panel     Albumin Random Urine Quantitative with Creat Ratio   2. Colon cancer screening  Z12.11 Adult Gastro Ref - Procedure Only     Future Appointments   Date Time Provider Department Center   2/17/2022  5:30 PM Nataly Martinez MD  [facility]      Completed by: Nataly Martinez M.D., [facility]. 2/17/2022 4:49 PM.  This transcription uses voice recognition software, which may contain typographical errors.  Start call: 4:49 PM. End call: 5:07 PM   ____________________________    Phone call duration: 18 minutes

## 2022-02-18 ENCOUNTER — LAB (OUTPATIENT)
Dept: LAB | Facility: CLINIC | Age: 47
End: 2022-02-18
Payer: COMMERCIAL

## 2022-02-18 ENCOUNTER — NURSE TRIAGE (OUTPATIENT)
Dept: NURSING | Facility: CLINIC | Age: 47
End: 2022-02-18

## 2022-02-18 DIAGNOSIS — E11.65 TYPE 2 DIABETES MELLITUS WITH HYPERGLYCEMIA, WITH LONG-TERM CURRENT USE OF INSULIN (H): ICD-10-CM

## 2022-02-18 DIAGNOSIS — Z79.4 TYPE 2 DIABETES MELLITUS WITH HYPERGLYCEMIA, WITH LONG-TERM CURRENT USE OF INSULIN (H): ICD-10-CM

## 2022-02-18 LAB
ALBUMIN SERPL-MCNC: 3.7 G/DL (ref 3.5–5)
ALP SERPL-CCNC: 66 U/L (ref 45–120)
ALT SERPL W P-5'-P-CCNC: 16 U/L (ref 0–45)
ANION GAP SERPL CALCULATED.3IONS-SCNC: 14 MMOL/L (ref 5–18)
AST SERPL W P-5'-P-CCNC: 14 U/L (ref 0–40)
BILIRUB SERPL-MCNC: 0.5 MG/DL (ref 0–1)
BUN SERPL-MCNC: 10 MG/DL (ref 8–22)
CALCIUM SERPL-MCNC: 9 MG/DL (ref 8.5–10.5)
CHLORIDE BLD-SCNC: 106 MMOL/L (ref 98–107)
CHOLEST SERPL-MCNC: 96 MG/DL
CO2 SERPL-SCNC: 24 MMOL/L (ref 22–31)
CREAT SERPL-MCNC: 0.88 MG/DL (ref 0.7–1.3)
CREAT UR-MCNC: 231 MG/DL
FASTING STATUS PATIENT QL REPORTED: NO
GFR SERPL CREATININE-BSD FRML MDRD: >90 ML/MIN/1.73M2
GLUCOSE BLD-MCNC: 53 MG/DL (ref 70–125)
HBA1C MFR BLD: 7.2 % (ref 0–5.6)
HDLC SERPL-MCNC: 39 MG/DL
LDLC SERPL CALC-MCNC: 45 MG/DL
MICROALBUMIN UR-MCNC: 4.17 MG/DL (ref 0–1.99)
MICROALBUMIN/CREAT UR: 18.1 MG/G CR
POTASSIUM BLD-SCNC: 3.7 MMOL/L (ref 3.5–5)
PROT SERPL-MCNC: 7 G/DL (ref 6–8)
SODIUM SERPL-SCNC: 144 MMOL/L (ref 136–145)
TRIGL SERPL-MCNC: 60 MG/DL

## 2022-02-18 PROCEDURE — 82043 UR ALBUMIN QUANTITATIVE: CPT

## 2022-02-18 PROCEDURE — 83036 HEMOGLOBIN GLYCOSYLATED A1C: CPT

## 2022-02-18 PROCEDURE — 80061 LIPID PANEL: CPT

## 2022-02-18 PROCEDURE — 36415 COLL VENOUS BLD VENIPUNCTURE: CPT

## 2022-02-18 PROCEDURE — 80053 COMPREHEN METABOLIC PANEL: CPT

## 2022-02-19 NOTE — TELEPHONE ENCOUNTER
Lab calling with critical glucose of 53. No answer from patient.    Looks like he takes metformin and insulin as well as checks his sugars at home.    1935: Paged Dr. Tijerina  1936: Dr. Tijerina called back- noted    Daria Ibarra RN [facility]    Reason for Disposition    Lab or radiology calling with CRITICAL test results    Protocols used: PCP CALL - NO TRIAGE-A-

## 2022-02-22 ENCOUNTER — TELEPHONE (OUTPATIENT)
Dept: FAMILY MEDICINE | Facility: CLINIC | Age: 47
End: 2022-02-22
Payer: COMMERCIAL

## 2022-02-22 NOTE — RESULT ENCOUNTER NOTE
Please let Valentin know:    Normal liver and kidney tests.  Overall, the cholesterol is GOOD. The HDL is a bit low. The best way to improve this is with exercise.    Your blood sugars are looking much better. Almost to goal. You want your A1c to be less than 7.  -Continue your current insulin doses.  -Increase empagliflozin from 10 mg to 25 mg every day.  -Continue metformin  mg one pill with supper every day.

## 2022-02-22 NOTE — TELEPHONE ENCOUNTER
Called and informed of test results     ----- Message from Nataly Martinez MD sent at 2/22/2022  2:33 PM CST -----  Please let Valentin know:    Normal liver and kidney tests.  Overall, the cholesterol is GOOD. The HDL is a bit low. The best way to improve this is with exercise.    Your blood sugars are looking much better. Almost to goal. You want your A1c to be less than 7.  -Continue your current insulin doses.  -Increase empagliflozin from 10 mg to 25 mg every day.  -Continue metformin  mg one pill with supper every day.

## 2022-03-19 DIAGNOSIS — Z79.4 TYPE 2 DIABETES MELLITUS WITH HYPERGLYCEMIA, WITH LONG-TERM CURRENT USE OF INSULIN (H): ICD-10-CM

## 2022-03-19 DIAGNOSIS — E11.65 TYPE 2 DIABETES MELLITUS WITH HYPERGLYCEMIA, WITH LONG-TERM CURRENT USE OF INSULIN (H): ICD-10-CM

## 2022-03-21 NOTE — TELEPHONE ENCOUNTER
"Routing refill request to provider for review/approval because:  PCP to review    Last Written Prescription Date:  11/19/2021  Last Fill Quantity: 15 mL,  # refills: 3   Last office visit provider:  2/17/2022 virtual visit Dr. Martinez     Requested Prescriptions   Pending Prescriptions Disp Refills     LANTUS SOLOSTAR 100 UNIT/ML soln [Pharmacy Med Name: LANTUS SOLOSTAR PEN INJ 3ML] 15 mL 3     Sig: ADMINISTER 47 UNITS UNDER THE SKIN AT BEDTIME       Long Acting Insulin Protocol Passed - 3/19/2022  1:56 PM        Passed - Serum creatinine on file in past 12 months     Recent Labs   Lab Test 02/18/22  1158   CR 0.88       Ok to refill medication if creatinine is low          Passed - HgbA1C in past 3 or 6 months     If HgbA1C is 8 or greater, it needs to be on file within the past 3 months.  If less than 8, must be on file within the past 6 months.     Recent Labs   Lab Test 02/18/22  1158   A1C 7.2*             Passed - Medication is active on med list        Passed - Patient is age 18 or older        Passed - Recent (6 mo) or future (30 days) visit within the authorizing provider's specialty     Patient had office visit in the last 6 months or has a visit in the next 30 days with authorizing provider or within the authorizing provider's specialty.  See \"Patient Info\" tab in inbasket, or \"Choose Columns\" in Meds & Orders section of the refill encounter.                 Celena Muir RN 03/21/22 11:26 AM  "

## 2022-03-22 RX ORDER — INSULIN GLARGINE 100 [IU]/ML
INJECTION, SOLUTION SUBCUTANEOUS
Qty: 15 ML | Refills: 3 | Status: SHIPPED | OUTPATIENT
Start: 2022-03-22 | End: 2022-07-12

## 2022-06-21 ENCOUNTER — OFFICE VISIT (OUTPATIENT)
Dept: FAMILY MEDICINE | Facility: CLINIC | Age: 47
End: 2022-06-21
Payer: COMMERCIAL

## 2022-06-21 VITALS
RESPIRATION RATE: 18 BRPM | SYSTOLIC BLOOD PRESSURE: 113 MMHG | BODY MASS INDEX: 30.94 KG/M2 | HEART RATE: 91 BPM | WEIGHT: 221 LBS | TEMPERATURE: 97.8 F | HEIGHT: 71 IN | DIASTOLIC BLOOD PRESSURE: 77 MMHG

## 2022-06-21 DIAGNOSIS — E11.65 TYPE 2 DIABETES MELLITUS WITH HYPERGLYCEMIA, WITH LONG-TERM CURRENT USE OF INSULIN (H): ICD-10-CM

## 2022-06-21 DIAGNOSIS — Z79.4 TYPE 2 DIABETES MELLITUS WITH HYPERGLYCEMIA, WITH LONG-TERM CURRENT USE OF INSULIN (H): ICD-10-CM

## 2022-06-21 DIAGNOSIS — Z12.11 COLON CANCER SCREENING: ICD-10-CM

## 2022-06-21 DIAGNOSIS — I10 ESSENTIAL HYPERTENSION: Primary | ICD-10-CM

## 2022-06-21 LAB
ALT SERPL W P-5'-P-CCNC: 16 U/L (ref 0–45)
ANION GAP SERPL CALCULATED.3IONS-SCNC: 13 MMOL/L (ref 5–18)
BUN SERPL-MCNC: 12 MG/DL (ref 8–22)
CALCIUM SERPL-MCNC: 9.1 MG/DL (ref 8.5–10.5)
CHLORIDE BLD-SCNC: 106 MMOL/L (ref 98–107)
CHOLEST SERPL-MCNC: 115 MG/DL
CO2 SERPL-SCNC: 24 MMOL/L (ref 22–31)
CREAT SERPL-MCNC: 0.98 MG/DL (ref 0.7–1.3)
FASTING STATUS PATIENT QL REPORTED: YES
GFR SERPL CREATININE-BSD FRML MDRD: >90 ML/MIN/1.73M2
GLUCOSE BLD-MCNC: 86 MG/DL (ref 70–125)
HBA1C MFR BLD: 6.3 % (ref 0–5.6)
HDLC SERPL-MCNC: 37 MG/DL
LDLC SERPL CALC-MCNC: 54 MG/DL
POTASSIUM BLD-SCNC: 4.1 MMOL/L (ref 3.5–5)
SODIUM SERPL-SCNC: 143 MMOL/L (ref 136–145)
TRIGL SERPL-MCNC: 122 MG/DL

## 2022-06-21 PROCEDURE — 83036 HEMOGLOBIN GLYCOSYLATED A1C: CPT | Performed by: FAMILY MEDICINE

## 2022-06-21 PROCEDURE — 99214 OFFICE O/P EST MOD 30 MIN: CPT | Mod: 25 | Performed by: FAMILY MEDICINE

## 2022-06-21 PROCEDURE — 80048 BASIC METABOLIC PNL TOTAL CA: CPT | Performed by: FAMILY MEDICINE

## 2022-06-21 PROCEDURE — 36415 COLL VENOUS BLD VENIPUNCTURE: CPT | Performed by: FAMILY MEDICINE

## 2022-06-21 PROCEDURE — 90677 PCV20 VACCINE IM: CPT | Performed by: FAMILY MEDICINE

## 2022-06-21 PROCEDURE — 84460 ALANINE AMINO (ALT) (SGPT): CPT | Performed by: FAMILY MEDICINE

## 2022-06-21 PROCEDURE — 80061 LIPID PANEL: CPT | Performed by: FAMILY MEDICINE

## 2022-06-21 PROCEDURE — 90471 IMMUNIZATION ADMIN: CPT | Performed by: FAMILY MEDICINE

## 2022-06-21 NOTE — LETTER
June 22, 2022      Valentin Cartagena  [address] [place] [address]        Dear ,    We are writing to inform you of your test results.      Your lab test results look great.  Keep up the good work on taking care of your diabetes!    Resulted Orders   Hemoglobin A1c   Result Value Ref Range    Hemoglobin A1C 6.3 (H) 0.0 - 5.6 %      Comment:      Normal <5.7%   Prediabetes 5.7-6.4%    Diabetes 6.5% or higher     Note: Adopted from ADA consensus guidelines.   Basic metabolic panel  (Ca, Cl, CO2, Creat, Gluc, K, Na, BUN)   Result Value Ref Range    Sodium 143 136 - 145 mmol/L    Potassium 4.1 3.5 - 5.0 mmol/L    Chloride 106 98 - 107 mmol/L    Carbon Dioxide (CO2) 24 22 - 31 mmol/L    Anion Gap 13 5 - 18 mmol/L    Urea Nitrogen 12 8 - 22 mg/dL    Creatinine 0.98 0.70 - 1.30 mg/dL    Calcium 9.1 8.5 - 10.5 mg/dL    Glucose 86 70 - 125 mg/dL    GFR Estimate >90 >60 mL/min/1.73m2      Comment:      Effective December 21, 2021 eGFRcr in adults is calculated using the 2021 CKD-EPI creatinine equation which includes age and gender (Fredis et al., NEJM, DOI: 10.1056/MVXWqn4089579)   Lipid panel reflex to direct LDL Fasting   Result Value Ref Range    Cholesterol 115 <=199 mg/dL    Triglycerides 122 <=149 mg/dL    Direct Measure HDL 37 (L) >=40 mg/dL      Comment:      HDL Cholesterol Reference Range:     0-2 years:   No reference ranges established for patients under 2 years old  at [facility] for lipid analytes.    2-8 years:  Greater than 45 mg/dL     18 years and older:   Female: Greater than or equal to 50 mg/dL   Male:   Greater than or equal to 40 mg/dL    LDL Cholesterol Calculated 54 <=129 mg/dL    Patient Fasting > 8hrs? Yes    ALT   Result Value Ref Range    ALT 16 0 - 45 U/L       If you have any questions or concerns, please call the clinic at the number listed above.       Sincerely,      Deepak Bob MD

## 2022-06-21 NOTE — PROGRESS NOTES
"  Assessment & Plan     Type 2 diabetes mellitus with hyperglycemia, with long-term current use of insulin (H)  Doing extremely well with current plan.  Will not make changes.  Advised on optometry evaluation as well as pneumococcal vaccine which she was interested in.  - Pneumococcal 20 Valent Conjugate (Prevnar 20)  - OPTOMETRY REFERRAL  - C FOOT EXAM  NO CHARGE  - Hemoglobin A1c  - Basic metabolic panel  (Ca, Cl, CO2, Creat, Gluc, K, Na, BUN)  - Lipid panel reflex to direct LDL Fasting  - ALT    Colon cancer screening  Options for colon cancer screening discussed.  Did not want to pursue a colonoscopy if he did not have to.  Cologuard discussed.  He will pursue that.  - COLOGUARD([employer])    Essential hypertension  Currently well controlled.  Recommend continuation of current low-dose lisinopril.     BMI:   Estimated body mass index is 30.94 kg/m  as calculated from the following:    Height as of this encounter: 1.8 m (5' 10.87\").    Weight as of this encounter: 100.2 kg (221 lb).       Return in about 3 months (around 9/21/2022) for Follow up.    Deepak Bob MD  [facility]    Deya Hanson is a 47 year old, presenting for the following health issues:  RECHECK      History of Present Illness       Diabetes:   He presents for follow up of diabetes.  He is checking home blood glucose two times daily. He checks blood glucose before meals.  Blood glucose is sometimes over 200 and sometimes under 70. He is aware of hypoglycemia symptoms including weakness. He is concerned about low blood sugar, several less than 70 in the past few weeks.  He is not experiencing numbness or burning in feet, excessive thirst, blurry vision, weight changes or redness, sores or blisters on feet. The patient has not had a diabetic eye exam in the last 12 months.         He eats 0-1 servings of fruits and vegetables daily.He consumes 1 sweetened beverage(s) daily.He exercises with enough effort to " increase his heart rate 9 or less minutes per day.  He exercises with enough effort to increase his heart rate 5 days per week.   He is taking medications regularly.       Diabetes Follow-up    How often are you checking your blood sugar? A few times a week  What time of day are you checking your blood sugars (select all that apply)?  Before and after meals  Have you had any blood sugars above 200?  No  Have you had any blood sugars below 70?  No    What symptoms do you notice when your blood sugar is low?  None    What concerns do you have today about your diabetes? None     Do you have any of these symptoms? (Select all that apply)  No numbness or tingling in feet.  No redness, sores or blisters on feet.  No complaints of excessive thirst.  No reports of blurry vision.  No significant changes to weight.    Have you had a diabetic eye exam in the last 12 months? No        BP Readings from Last 2 Encounters:   06/21/22 113/77   11/19/21 110/76     Hemoglobin A1C (%)   Date Value   02/18/2022 7.2 (H)   09/24/2021 9.3 (H)     LDL Cholesterol Calculated (mg/dL)   Date Value   02/18/2022 45   09/24/2021 23     LDL Cholesterol Direct (mg/dl)   Date Value   06/06/2014 56                   Objective    There were no vitals taken for this visit.  There is no height or weight on file to calculate BMI.  Physical Exam   GENERAL: alert, not distressed  CHEST: clear, no rales, rhonchi, or wheezes  CARDIAC: regular without murmur, gallop, or rub  ABDOMEN: soft, non tender, non distended, normal bowel sounds        Results for orders placed or performed in visit on 06/21/22   Hemoglobin A1c     Status: Abnormal   Result Value Ref Range    Hemoglobin A1C 6.3 (H) 0.0 - 5.6 %   Basic metabolic panel  (Ca, Cl, CO2, Creat, Gluc, K, Na, BUN)     Status: Normal   Result Value Ref Range    Sodium 143 136 - 145 mmol/L    Potassium 4.1 3.5 - 5.0 mmol/L    Chloride 106 98 - 107 mmol/L    Carbon Dioxide (CO2) 24 22 - 31 mmol/L    Anion Gap 13  5 - 18 mmol/L    Urea Nitrogen 12 8 - 22 mg/dL    Creatinine 0.98 0.70 - 1.30 mg/dL    Calcium 9.1 8.5 - 10.5 mg/dL    Glucose 86 70 - 125 mg/dL    GFR Estimate >90 >60 mL/min/1.73m2   Lipid panel reflex to direct LDL Fasting     Status: Abnormal   Result Value Ref Range    Cholesterol 115 <=199 mg/dL    Triglycerides 122 <=149 mg/dL    Direct Measure HDL 37 (L) >=40 mg/dL    LDL Cholesterol Calculated 54 <=129 mg/dL    Patient Fasting > 8hrs? Yes    ALT     Status: Normal   Result Value Ref Range    ALT 16 0 - 45 U/L                   .  ..

## 2022-06-22 ENCOUNTER — TELEPHONE (OUTPATIENT)
Dept: FAMILY MEDICINE | Facility: CLINIC | Age: 47
End: 2022-06-22

## 2022-06-22 NOTE — TELEPHONE ENCOUNTER
----- Message from Deepak Bob MD sent at 6/22/2022  7:59 AM CDT -----  Call:  Your lab test results look great.  Keep up the good work on taking care of your diabetes!

## 2022-07-06 DIAGNOSIS — Z79.4 TYPE 2 DIABETES MELLITUS WITH HYPERGLYCEMIA, WITH LONG-TERM CURRENT USE OF INSULIN (H): Primary | ICD-10-CM

## 2022-07-06 DIAGNOSIS — E11.65 TYPE 2 DIABETES MELLITUS WITH HYPERGLYCEMIA, WITH LONG-TERM CURRENT USE OF INSULIN (H): Primary | ICD-10-CM

## 2022-07-06 RX ORDER — ATORVASTATIN CALCIUM 40 MG/1
40 TABLET, FILM COATED ORAL DAILY
Qty: 90 TABLET | Refills: 1 | Status: SHIPPED | OUTPATIENT
Start: 2022-07-06 | End: 2023-05-22

## 2022-07-06 NOTE — TELEPHONE ENCOUNTER
Pending Prescriptions:                       Disp   Refills    atorvastatin (LIPITOR) 40 MG tablet       90 tab*1            Sig: Take 1 tablet (40 mg) by mouth daily

## 2022-07-12 DIAGNOSIS — Z79.4 TYPE 2 DIABETES MELLITUS WITH HYPERGLYCEMIA, WITH LONG-TERM CURRENT USE OF INSULIN (H): ICD-10-CM

## 2022-07-12 DIAGNOSIS — E11.65 TYPE 2 DIABETES MELLITUS WITH HYPERGLYCEMIA, WITH LONG-TERM CURRENT USE OF INSULIN (H): ICD-10-CM

## 2022-07-13 RX ORDER — INSULIN GLARGINE 100 [IU]/ML
47 INJECTION, SOLUTION SUBCUTANEOUS AT BEDTIME
Qty: 45 ML | Refills: 1 | Status: SHIPPED | OUTPATIENT
Start: 2022-07-13 | End: 2023-01-23

## 2022-07-13 NOTE — TELEPHONE ENCOUNTER
"Routing refill request to provider for review/approval because:  Ordered amount exceeds pool limit of 180 days.  Provider approval needed.    Last Written Prescription Date:  3/22/22  Last Fill Quantity: 15 ml,  # refills: 3   Last office visit provider:  6/21/22     Requested Prescriptions   Pending Prescriptions Disp Refills     insulin glargine (LANTUS SOLOSTAR) 100 UNIT/ML pen 15 mL 3     Sig: Inject 47 Units Subcutaneous At Bedtime       Long Acting Insulin Protocol Passed - 7/13/2022 11:42 AM        Passed - Serum creatinine on file in past 12 months     Recent Labs   Lab Test 06/21/22  1025   CR 0.98       Ok to refill medication if creatinine is low          Passed - HgbA1C in past 3 or 6 months     If HgbA1C is 8 or greater, it needs to be on file within the past 3 months.  If less than 8, must be on file within the past 6 months.     Recent Labs   Lab Test 06/21/22  1025   A1C 6.3*             Passed - Medication is active on med list        Passed - Patient is age 18 or older        Passed - Recent (6 mo) or future (30 days) visit within the authorizing provider's specialty     Patient had office visit in the last 6 months or has a visit in the next 30 days with authorizing provider or within the authorizing provider's specialty.  See \"Patient Info\" tab in inbasket, or \"Choose Columns\" in Meds & Orders section of the refill encounter.                 Marco Seymour RN 07/13/22 11:43 AM  "

## 2022-07-28 DIAGNOSIS — Z79.4 TYPE 2 DIABETES MELLITUS WITH HYPERGLYCEMIA, WITH LONG-TERM CURRENT USE OF INSULIN (H): ICD-10-CM

## 2022-07-28 DIAGNOSIS — E11.65 TYPE 2 DIABETES MELLITUS WITH HYPERGLYCEMIA, WITH LONG-TERM CURRENT USE OF INSULIN (H): ICD-10-CM

## 2022-07-28 RX ORDER — INSULIN ASPART 100 [IU]/ML
INJECTION, SOLUTION INTRAVENOUS; SUBCUTANEOUS
Qty: 15 ML | Refills: 1 | Status: SHIPPED | OUTPATIENT
Start: 2022-07-28 | End: 2022-12-23

## 2022-07-28 NOTE — TELEPHONE ENCOUNTER
"Last Written Prescription Date:  11/19/21  Last Fill Quantity: 15,  # refills: 3   Last office visit provider:  6/21/22     Requested Prescriptions   Pending Prescriptions Disp Refills     Insulin Aspart FlexPen 100 UNIT/ML SOPN [Pharmacy Med Name: INSULIN ASPART FLEXPEN INJ, 3ML] 15 mL 3     Sig: ADMINISTER 27 UNITS UNDER THE SKIN TWICE DAILY WITH MEALS       Short Acting Insulin Protocol Passed - 7/28/2022 10:08 AM        Passed - Serum creatinine on file in past 12 months     Recent Labs   Lab Test 06/21/22  1025   CR 0.98       Ok to refill medication if creatinine is low          Passed - HgbA1C in past 3 or 6 months     If HgbA1C is 8 or greater, it needs to be on file within the past 3 months.  If less than 8, must be on file within the past 6 months.     Recent Labs   Lab Test 06/21/22  1025   A1C 6.3*             Passed - Medication is active on med list        Passed - Patient is age 18 or older        Passed - Recent (6 mo) or future (30 days) visit within the authorizing provider's specialty     Patient had office visit in the last 6 months or has a visit in the next 30 days with authorizing provider or within the authorizing provider's specialty.  See \"Patient Info\" tab in inbasket, or \"Choose Columns\" in Meds & Orders section of the refill encounter.                 Ellen Miranda RN 07/28/22 4:00 PM  " Caller would like to discuss an/a Return call for Neck, left back of thigh. Writer advised caller of callback within 24-72 hours.    Patient Name: Karey Perkins  Caller Name:  Same   Name of Facility:    Callback Number:  [phone]  Best Availability:  Any time   Can A Detailed Message Be left? yes  Fax Number:    Additional Info:  Patient is not sure if she needs to have xrays, the neck pain has been going on for months, but now it has going down left back of thigh.  Patient would like a return call please contact patient   Did you confirm the message with the caller?: yes    Thank you,  Nancy Ramírez

## 2022-12-03 DIAGNOSIS — E11.65 TYPE 2 DIABETES MELLITUS WITH HYPERGLYCEMIA, WITH LONG-TERM CURRENT USE OF INSULIN (H): ICD-10-CM

## 2022-12-03 DIAGNOSIS — Z79.4 TYPE 2 DIABETES MELLITUS WITH HYPERGLYCEMIA, WITH LONG-TERM CURRENT USE OF INSULIN (H): ICD-10-CM

## 2022-12-04 DIAGNOSIS — Z79.4 TYPE 2 DIABETES MELLITUS WITH HYPERGLYCEMIA, WITH LONG-TERM CURRENT USE OF INSULIN (H): ICD-10-CM

## 2022-12-04 DIAGNOSIS — E11.65 TYPE 2 DIABETES MELLITUS WITH HYPERGLYCEMIA, WITH LONG-TERM CURRENT USE OF INSULIN (H): ICD-10-CM

## 2022-12-05 RX ORDER — METFORMIN HYDROCHLORIDE 750 MG/1
TABLET, EXTENDED RELEASE ORAL
Qty: 180 TABLET | Refills: 2 | Status: SHIPPED | OUTPATIENT
Start: 2022-12-05 | End: 2023-12-04

## 2022-12-05 RX ORDER — BLOOD SUGAR DIAGNOSTIC
STRIP MISCELLANEOUS
Qty: 100 STRIP | Refills: 0 | Status: SHIPPED | OUTPATIENT
Start: 2022-12-05 | End: 2023-01-10

## 2022-12-05 NOTE — TELEPHONE ENCOUNTER
"Last Written Prescription Date:  02/17/2022  Last Fill Quantity: 90,  # refills: 3   Last office visit provider:  06/21/2022     Requested Prescriptions   Pending Prescriptions Disp Refills     metFORMIN (GLUCOPHAGE-XR) 750 MG 24 hr tablet [Pharmacy Med Name: METFORMIN ER 750MG 24HR TABS] 180 tablet      Sig: TAKE 2 TABLETS(1500 MG) BY MOUTH DAILY WITH DINNER       Biguanide Agents Passed - 12/3/2022  2:42 PM        Passed - Patient is age 10 or older        Passed - Patient has documented A1c within the specified period of time.     If HgbA1C is 8 or greater, it needs to be on file within the past 3 months.  If less than 8, must be on file within the past 6 months.     Recent Labs   Lab Test 06/21/22  1025   A1C 6.3*             Passed - Patient's CR is NOT>1.4 OR Patient's EGFR is NOT<45 within past 12 mos.     Recent Labs   Lab Test 06/21/22  1025 09/24/21  1101 05/12/21  0942   GFRESTIMATED >90   < > >60   GFRESTBLACK  --   --  >60    < > = values in this interval not displayed.       Recent Labs   Lab Test 06/21/22  1025   CR 0.98             Passed - Patient does NOT have a diagnosis of CHF.        Passed - Medication is active on med list        Passed - Recent (6 mo) or future (30 days) visit within the authorizing provider's specialty     Patient had office visit in the last 6 months or has a visit in the next 30 days with authorizing provider or within the authorizing provider's specialty.  See \"Patient Info\" tab in inbasket, or \"Choose Columns\" in Meds & Orders section of the refill encounter.                 Suzanne Bray RN 12/05/22 2:57 PM  "

## 2022-12-06 NOTE — TELEPHONE ENCOUNTER
"Last Written Prescription Date:  9/28/21  Last Fill Quantity: 100,  # refills: 6   Last office visit provider:  6/21/22     Requested Prescriptions   Pending Prescriptions Disp Refills     ACCU-CHEK GUIDE test strip [Pharmacy Med Name: ACCU-CHEK GUIDE TEST STRIPS 100S] 100 strip 6     Sig: USE TO TEST THREE TIMES DAILY OR AS DIRECTED       Diabetic Supplies Protocol Passed - 12/4/2022  3:43 PM        Passed - Medication is active on med list        Passed - Patient is 18 years of age or older        Passed - Recent (6 mo) or future (30 days) visit within the authorizing provider's specialty     Patient had office visit in the last 6 months or has a visit in the next 30 days with authorizing provider.  See \"Patient Info\" tab in inbasket, or \"Choose Columns\" in Meds & Orders section of the refill encounter.                 Erie, Ellen, RN 12/05/22 6:39 PM  "

## 2022-12-22 DIAGNOSIS — Z79.4 TYPE 2 DIABETES MELLITUS WITH HYPERGLYCEMIA, WITH LONG-TERM CURRENT USE OF INSULIN (H): ICD-10-CM

## 2022-12-22 DIAGNOSIS — E11.65 TYPE 2 DIABETES MELLITUS WITH HYPERGLYCEMIA, WITH LONG-TERM CURRENT USE OF INSULIN (H): ICD-10-CM

## 2022-12-23 RX ORDER — INSULIN ASPART 100 [IU]/ML
INJECTION, SOLUTION INTRAVENOUS; SUBCUTANEOUS
Qty: 15 ML | Refills: 0 | Status: SHIPPED | OUTPATIENT
Start: 2022-12-23 | End: 2023-02-28

## 2022-12-23 NOTE — TELEPHONE ENCOUNTER
"Routing refill request to provider for review/approval because:  Labs not current:  A1C  Patient needs to be seen because it has been more than 6 months since last office visit.    Last Written Prescription Date:  7/28/22  Last Fill Quantity: 15 ml,  # refills: 1   Last office visit provider:  6/21/22     Requested Prescriptions   Pending Prescriptions Disp Refills     Insulin Aspart FlexPen 100 UNIT/ML SOPN [Pharmacy Med Name: INSULIN ASPART FLEXPEN INJ, 3ML] 15 mL 1     Sig: ADMINISTER 27 UNITS UNDER THE SKIN TWICE DAILY WITH MEALS       Short Acting Insulin Protocol Failed - 12/23/2022 11:45 AM        Failed - HgbA1C in past 3 or 6 months     If HgbA1C is 8 or greater, it needs to be on file within the past 3 months.  If less than 8, must be on file within the past 6 months.     Recent Labs   Lab Test 06/21/22  1025   A1C 6.3*             Failed - Recent (6 mo) or future (30 days) visit within the authorizing provider's specialty     Patient had office visit in the last 6 months or has a visit in the next 30 days with authorizing provider or within the authorizing provider's specialty.  See \"Patient Info\" tab in inbasket, or \"Choose Columns\" in Meds & Orders section of the refill encounter.            Passed - Serum creatinine on file in past 12 months     Recent Labs   Lab Test 06/21/22  1025   CR 0.98       Ok to refill medication if creatinine is low          Passed - Medication is active on med list        Passed - Patient is age 18 or older             Marco Seymour RN 12/23/22 11:45 AM  "

## 2023-01-08 DIAGNOSIS — Z79.4 TYPE 2 DIABETES MELLITUS WITH HYPERGLYCEMIA, WITH LONG-TERM CURRENT USE OF INSULIN (H): ICD-10-CM

## 2023-01-08 DIAGNOSIS — E11.65 TYPE 2 DIABETES MELLITUS WITH HYPERGLYCEMIA, WITH LONG-TERM CURRENT USE OF INSULIN (H): ICD-10-CM

## 2023-01-10 RX ORDER — BLOOD SUGAR DIAGNOSTIC
STRIP MISCELLANEOUS
Qty: 300 STRIP | Refills: 0 | Status: SHIPPED | OUTPATIENT
Start: 2023-01-10 | End: 2023-08-11

## 2023-01-10 NOTE — TELEPHONE ENCOUNTER
"Last Written Prescription Date:  12/5/22  Last Fill Quantity: 100,  # refills: 0   Last office visit provider:  6/21/22 - scheduled 1/23/22     Requested Prescriptions   Pending Prescriptions Disp Refills     ACCU-CHEK GUIDE test strip [Pharmacy Med Name: ACCU-CHEK GUIDE TEST STRIPS 100S] 100 strip 0     Sig: USE TO TEST THREE TIMES DAILY OR AS DIRECTED       Diabetic Supplies Protocol Passed - 1/10/2023 11:10 AM        Passed - Medication is active on med list        Passed - Patient is 18 years of age or older        Passed - Recent (6 mo) or future (30 days) visit within the authorizing provider's specialty     Patient had office visit in the last 6 months or has a visit in the next 30 days with authorizing provider.  See \"Patient Info\" tab in inbasket, or \"Choose Columns\" in Meds & Orders section of the refill encounter.                 Marco Seymour RN 01/10/23 11:10 AM  "

## 2023-01-23 ENCOUNTER — LAB (OUTPATIENT)
Dept: FAMILY MEDICINE | Facility: CLINIC | Age: 48
End: 2023-01-23

## 2023-01-23 ENCOUNTER — MEDICAL CORRESPONDENCE (OUTPATIENT)
Dept: HEALTH INFORMATION MANAGEMENT | Facility: CLINIC | Age: 48
End: 2023-01-23

## 2023-01-23 ENCOUNTER — OFFICE VISIT (OUTPATIENT)
Dept: FAMILY MEDICINE | Facility: CLINIC | Age: 48
End: 2023-01-23
Payer: COMMERCIAL

## 2023-01-23 VITALS
DIASTOLIC BLOOD PRESSURE: 78 MMHG | RESPIRATION RATE: 18 BRPM | SYSTOLIC BLOOD PRESSURE: 117 MMHG | TEMPERATURE: 97.4 F | HEART RATE: 75 BPM | OXYGEN SATURATION: 97 % | WEIGHT: 215 LBS | BODY MASS INDEX: 30.1 KG/M2

## 2023-01-23 DIAGNOSIS — Z12.11 COLON CANCER SCREENING: ICD-10-CM

## 2023-01-23 DIAGNOSIS — Z79.4 TYPE 2 DIABETES MELLITUS WITH HYPERGLYCEMIA, WITH LONG-TERM CURRENT USE OF INSULIN (H): Primary | ICD-10-CM

## 2023-01-23 DIAGNOSIS — E11.65 TYPE 2 DIABETES MELLITUS WITH HYPERGLYCEMIA, WITH LONG-TERM CURRENT USE OF INSULIN (H): Primary | ICD-10-CM

## 2023-01-23 DIAGNOSIS — Z12.11 SCREEN FOR COLON CANCER: ICD-10-CM

## 2023-01-23 LAB
ANION GAP SERPL CALCULATED.3IONS-SCNC: 14 MMOL/L (ref 7–15)
BUN SERPL-MCNC: 10.9 MG/DL (ref 6–20)
CALCIUM SERPL-MCNC: 8.9 MG/DL (ref 8.6–10)
CHLORIDE SERPL-SCNC: 107 MMOL/L (ref 98–107)
CHOLEST SERPL-MCNC: 118 MG/DL
CREAT SERPL-MCNC: 0.86 MG/DL (ref 0.67–1.17)
CREAT UR-MCNC: 125 MG/DL
DEPRECATED HCO3 PLAS-SCNC: 21 MMOL/L (ref 22–29)
GFR SERPL CREATININE-BSD FRML MDRD: >90 ML/MIN/1.73M2
GLUCOSE SERPL-MCNC: 122 MG/DL (ref 70–99)
HBA1C MFR BLD: 7.6 % (ref 0–5.6)
HDLC SERPL-MCNC: 52 MG/DL
LDLC SERPL CALC-MCNC: 54 MG/DL
MICROALBUMIN UR-MCNC: <12 MG/L
MICROALBUMIN/CREAT UR: NORMAL MG/G{CREAT}
NONHDLC SERPL-MCNC: 66 MG/DL
POTASSIUM SERPL-SCNC: 4.3 MMOL/L (ref 3.4–5.3)
SODIUM SERPL-SCNC: 142 MMOL/L (ref 136–145)
TRIGL SERPL-MCNC: 60 MG/DL

## 2023-01-23 PROCEDURE — 99213 OFFICE O/P EST LOW 20 MIN: CPT | Mod: 25 | Performed by: FAMILY MEDICINE

## 2023-01-23 PROCEDURE — 82043 UR ALBUMIN QUANTITATIVE: CPT | Performed by: FAMILY MEDICINE

## 2023-01-23 PROCEDURE — 80048 BASIC METABOLIC PNL TOTAL CA: CPT | Performed by: FAMILY MEDICINE

## 2023-01-23 PROCEDURE — 90471 IMMUNIZATION ADMIN: CPT | Performed by: FAMILY MEDICINE

## 2023-01-23 PROCEDURE — 90686 IIV4 VACC NO PRSV 0.5 ML IM: CPT | Performed by: FAMILY MEDICINE

## 2023-01-23 PROCEDURE — 83036 HEMOGLOBIN GLYCOSYLATED A1C: CPT | Performed by: FAMILY MEDICINE

## 2023-01-23 PROCEDURE — 80061 LIPID PANEL: CPT | Performed by: FAMILY MEDICINE

## 2023-01-23 PROCEDURE — 36415 COLL VENOUS BLD VENIPUNCTURE: CPT | Performed by: FAMILY MEDICINE

## 2023-01-23 PROCEDURE — 82570 ASSAY OF URINE CREATININE: CPT | Performed by: FAMILY MEDICINE

## 2023-01-23 RX ORDER — INSULIN GLARGINE 100 [IU]/ML
47 INJECTION, SOLUTION SUBCUTANEOUS AT BEDTIME
Qty: 45 ML | Refills: 1 | Status: SHIPPED | OUTPATIENT
Start: 2023-01-23 | End: 2023-07-15

## 2023-01-23 NOTE — PROGRESS NOTES
"  Assessment & Plan     Type 2 diabetes mellitus with hyperglycemia, with long-term current use of insulin (H)  Had considered being off atorvastatin due to low LDL.  We will recheck that again today.  Some increase in A1c but still in a reasonable range.  Getting back on his regular medication regimen would likely be helpful.  He states he has all medications now.  Currently off lisinopril and blood pressure is well controlled.  We will continue to monitor.  Needs microalbumin today.  - Adult Eye  Referral  - HEMOGLOBIN A1C  - Albumin Random Urine Quantitative with Creat Ratio  - Basic metabolic panel  (Ca, Cl, CO2, Creat, Gluc, K, Na, BUN)  - insulin glargine (LANTUS SOLOSTAR) 100 UNIT/ML pen  Dispense: 45 mL; Refill: 1  - Lipid panel reflex to direct LDL Non-fasting  - HEMOGLOBIN A1C  - Basic metabolic panel  (Ca, Cl, CO2, Creat, Gluc, K, Na, BUN)  - Lipid panel reflex to direct LDL Non-fasting  - Albumin Random Urine Quantitative with Creat Ratio    Screen for colon cancer  Colon cancer screening  Cologuard previously ordered.  Patient left town and does not have a kit to complete this.  He would like to do this rather than consider endoscopy.  - JEFFERY([employer])         BMI:   Estimated body mass index is 30.1 kg/m  as calculated from the following:    Height as of 6/21/22: 1.8 m (5' 10.87\").    Weight as of this encounter: 97.5 kg (215 lb).           No follow-ups on file.    Deepak Bob MD  [facility]    Deya Hanson is a 47 year old accompanied by his self, presenting for the following health issues:  Diabetes      History of Present Illness       Diabetes:   He presents for follow up of diabetes.  He is checking home blood glucose two times daily. He checks blood glucose before meals.  Blood glucose is sometimes over 200 and never under 70. He is aware of hypoglycemia symptoms including weakness. He has no concerns regarding his diabetes at this time.  He " is not experiencing numbness or burning in feet, excessive thirst, blurry vision, weight changes or redness, sores or blisters on feet. The patient has not had a diabetic eye exam in the last 12 months.         He eats 2-3 servings of fruits and vegetables daily.He consumes 2 sweetened beverage(s) daily.He exercises with enough effort to increase his heart rate 10 to 19 minutes per day.  He exercises with enough effort to increase his heart rate 4 days per week.   He is taking medications regularly.       Diabetes Follow-up      How often are you checking your blood sugar? Not at all    What concerns do you have today about your diabetes? None     Do you have any of these symptoms? (Select all that apply)  No numbness or tingling in feet.  No redness, sores or blisters on feet.  No complaints of excessive thirst.  No reports of blurry vision.  No significant changes to weight.    Have you had a diabetic eye exam in the last 12 months? No    Valentin returns to move back and forth between here in [place].  Ran out of Acrisure and started using insulin from [employer].  He was in [place] for about 3 months.  Now he is back in town and working on finding a job again.  Had been really well controlled as far as diabetes goes on metformin, Jardiance, Lantus, and NovoLog.    BP Readings from Last 2 Encounters:   01/23/23 117/78   06/21/22 113/77     Hemoglobin A1C (%)   Date Value   01/23/2023 7.6 (H)   06/21/2022 6.3 (H)     LDL Cholesterol Calculated (mg/dL)   Date Value   06/21/2022 54   02/18/2022 45     LDL Cholesterol Direct (mg/dl)   Date Value   06/06/2014 56       Objective    /78 (BP Location: Right arm, Patient Position: Sitting, Cuff Size: Adult Large)   Pulse 75   Temp 97.4  F (36.3  C) (Temporal)   Resp 18   Wt 97.5 kg (215 lb)   SpO2 97%   BMI 30.10 kg/m    Body mass index is 30.1 kg/m .  Physical Exam   GENERAL: alert, not distressed  CHEST: clear, no rales, rhonchi, or wheezes  CARDIAC: regular  without murmur, gallop, or rub  ABDOMEN: soft, non tender, non distended, normal bowel sounds        Results for orders placed or performed in visit on 01/23/23 (from the past 24 hour(s))   HEMOGLOBIN A1C   Result Value Ref Range    Hemoglobin A1C 7.6 (H) 0.0 - 5.6 %

## 2023-01-25 ENCOUNTER — TELEPHONE (OUTPATIENT)
Dept: FAMILY MEDICINE | Facility: CLINIC | Age: 48
End: 2023-01-25
Payer: COMMERCIAL

## 2023-01-25 NOTE — TELEPHONE ENCOUNTER
----- Message from Deepak Bob MD sent at 1/25/2023  8:51 AM CST -----  Call:  Your labs look pretty good, even though you had some medication changes.  Let's get you back to your [place] medicines and recheck in 3 months.

## 2023-01-26 DIAGNOSIS — E11.65 TYPE 2 DIABETES MELLITUS WITH HYPERGLYCEMIA, WITH LONG-TERM CURRENT USE OF INSULIN (H): ICD-10-CM

## 2023-01-26 DIAGNOSIS — Z79.4 TYPE 2 DIABETES MELLITUS WITH HYPERGLYCEMIA, WITH LONG-TERM CURRENT USE OF INSULIN (H): ICD-10-CM

## 2023-01-27 RX ORDER — LANCETS
EACH MISCELLANEOUS
Qty: 100 EACH | Refills: 1 | Status: SHIPPED | OUTPATIENT
Start: 2023-01-27 | End: 2023-03-25

## 2023-01-27 NOTE — TELEPHONE ENCOUNTER
"Last Written Prescription Date:  9/28/21  Last Fill Quantity: 100,  # refills: 6   Last office visit provider:  1/23/23     Requested Prescriptions   Pending Prescriptions Disp Refills     blood glucose monitoring (SOFTCLIX) lancets [Pharmacy Med Name: SOFTCLIX LANCETS]       Sig: USE WITH LANCING DEVICE AS DIRECTED       Diabetic Supplies Protocol Passed - 1/26/2023 11:19 PM        Passed - Medication is active on med list        Passed - Patient is 18 years of age or older        Passed - Recent (6 mo) or future (30 days) visit within the authorizing provider's specialty     Patient had office visit in the last 6 months or has a visit in the next 30 days with authorizing provider.  See \"Patient Info\" tab in inbasket, or \"Choose Columns\" in Meds & Orders section of the refill encounter.                 Ellen Miranda RN 01/27/23 3:38 PM  "

## 2023-02-26 DIAGNOSIS — E11.65 TYPE 2 DIABETES MELLITUS WITH HYPERGLYCEMIA, WITH LONG-TERM CURRENT USE OF INSULIN (H): ICD-10-CM

## 2023-02-26 DIAGNOSIS — Z79.4 TYPE 2 DIABETES MELLITUS WITH HYPERGLYCEMIA, WITH LONG-TERM CURRENT USE OF INSULIN (H): ICD-10-CM

## 2023-02-27 DIAGNOSIS — E11.65 TYPE 2 DIABETES MELLITUS WITH HYPERGLYCEMIA, WITH LONG-TERM CURRENT USE OF INSULIN (H): ICD-10-CM

## 2023-02-27 DIAGNOSIS — Z79.4 TYPE 2 DIABETES MELLITUS WITH HYPERGLYCEMIA, WITH LONG-TERM CURRENT USE OF INSULIN (H): ICD-10-CM

## 2023-02-27 RX ORDER — EMPAGLIFLOZIN 25 MG/1
TABLET, FILM COATED ORAL
Qty: 90 TABLET | Refills: 1 | Status: SHIPPED | OUTPATIENT
Start: 2023-02-27 | End: 2023-08-12

## 2023-02-27 NOTE — TELEPHONE ENCOUNTER
"Last Written Prescription Date:  2/22/22  Last Fill Quantity: 90,  # refills: 3   Last office visit provider:  1/23/23     Requested Prescriptions   Pending Prescriptions Disp Refills     JARDIANCE 25 MG TABS tablet [Pharmacy Med Name: JARDIANCE 25MG TABLETS] 90 tablet 3     Sig: TAKE 1 TABLET(25 MG) BY MOUTH DAILY       Sodium Glucose Co-Transport Inhibitor Agents Passed - 2/26/2023  1:07 PM        Passed - Patient has documented A1c within the specified period of time.     If HgbA1C is 8 or greater, it needs to be on file within the past 3 months.  If less than 8, must be on file within the past 6 months.     Recent Labs   Lab Test 01/23/23  1348   A1C 7.6*             Passed - No creatinine >1.4 or GFR <45 within the past 12 mos     Recent Labs   Lab Test 01/23/23  1348 09/24/21  1101 05/12/21  0942   GFRESTIMATED >90   < > >60   GFRESTBLACK  --   --  >60    < > = values in this interval not displayed.       Recent Labs   Lab Test 01/23/23  1348   CR 0.86             Passed - Medication is active on med list        Passed - Patient is age 18 or older        Passed - Patient has documented normal Potassium within the last 12 mos.     Recent Labs   Lab Test 01/23/23  1348   POTASSIUM 4.3             Passed - Recent (6 mo) or future (30 days) visit within the authorizing provider's specialty     Patient had office visit in the last 6 months or has a visit in the next 30 days with authorizing provider or within the authorizing provider's specialty.  See \"Patient Info\" tab in inbasket, or \"Choose Columns\" in Meds & Orders section of the refill encounter.                 Ellen Miranda, RN 02/27/23 4:32 PM  "

## 2023-02-28 RX ORDER — INSULIN ASPART 100 [IU]/ML
INJECTION, SOLUTION INTRAVENOUS; SUBCUTANEOUS
Qty: 15 ML | Refills: 1 | Status: SHIPPED | OUTPATIENT
Start: 2023-02-28 | End: 2023-04-20

## 2023-02-28 NOTE — TELEPHONE ENCOUNTER
"Last Written Prescription Date:  12/23/22  Last Fill Quantity: 15,  # refills: 0   Last office visit provider:  1/23/23     Requested Prescriptions   Pending Prescriptions Disp Refills     Insulin Aspart FlexPen 100 UNIT/ML SOPN [Pharmacy Med Name: INSULIN ASPART FLEXPEN INJ, 3ML] 15 mL 0     Sig: ADMINISTER 27 UNITS UNDER THE SKIN TWICE DAILY WITH MEALS       Short Acting Insulin Protocol Passed - 2/27/2023  4:14 PM        Passed - Serum creatinine on file in past 12 months     Recent Labs   Lab Test 01/23/23  1348   CR 0.86       Ok to refill medication if creatinine is low          Passed - HgbA1C in past 3 or 6 months     If HgbA1C is 8 or greater, it needs to be on file within the past 3 months.  If less than 8, must be on file within the past 6 months.     Recent Labs   Lab Test 01/23/23  1348   A1C 7.6*             Passed - Medication is active on med list        Passed - Patient is age 18 or older        Passed - Recent (6 mo) or future (30 days) visit within the authorizing provider's specialty     Patient had office visit in the last 6 months or has a visit in the next 30 days with authorizing provider or within the authorizing provider's specialty.  See \"Patient Info\" tab in inbasket, or \"Choose Columns\" in Meds & Orders section of the refill encounter.                 Ellen Miranda RN 02/28/23 5:54 PM  " Chart(s)/Patient

## 2023-03-01 DIAGNOSIS — Z79.4 TYPE 2 DIABETES MELLITUS WITH HYPERGLYCEMIA, WITH LONG-TERM CURRENT USE OF INSULIN (H): ICD-10-CM

## 2023-03-01 DIAGNOSIS — E11.65 TYPE 2 DIABETES MELLITUS WITH HYPERGLYCEMIA, WITH LONG-TERM CURRENT USE OF INSULIN (H): ICD-10-CM

## 2023-03-02 RX ORDER — METFORMIN HYDROCHLORIDE 750 MG/1
TABLET, EXTENDED RELEASE ORAL
Qty: 90 TABLET | OUTPATIENT
Start: 2023-03-02

## 2023-03-23 DIAGNOSIS — Z79.4 TYPE 2 DIABETES MELLITUS WITHOUT COMPLICATION, WITH LONG-TERM CURRENT USE OF INSULIN (H): ICD-10-CM

## 2023-03-23 DIAGNOSIS — E11.9 TYPE 2 DIABETES MELLITUS WITHOUT COMPLICATION, WITH LONG-TERM CURRENT USE OF INSULIN (H): ICD-10-CM

## 2023-03-24 DIAGNOSIS — Z79.4 TYPE 2 DIABETES MELLITUS WITH HYPERGLYCEMIA, WITH LONG-TERM CURRENT USE OF INSULIN (H): ICD-10-CM

## 2023-03-24 DIAGNOSIS — E11.65 TYPE 2 DIABETES MELLITUS WITH HYPERGLYCEMIA, WITH LONG-TERM CURRENT USE OF INSULIN (H): ICD-10-CM

## 2023-03-24 RX ORDER — PEN NEEDLE, DIABETIC 32GX 5/32"
NEEDLE, DISPOSABLE MISCELLANEOUS
Qty: 400 EACH | Refills: 3 | Status: SHIPPED | OUTPATIENT
Start: 2023-03-24 | End: 2024-05-21

## 2023-03-24 NOTE — TELEPHONE ENCOUNTER
"Last Written Prescription Date:  6/28/2021  Last Fill Quantity: 400,  # refills: 3   Last office visit provider:  1/23/2023     Requested Prescriptions   Pending Prescriptions Disp Refills     BD PEN NEEDLE TRISH 2ND GEN 32G X 4 MM miscellaneous [Pharmacy Med Name: B-D TRISH 2ND GEN PEN NDL [id number]]       Sig: USE WITH LANTUS AND NOVOLOG INSULIN       Diabetic Supplies Protocol Passed - 3/23/2023  2:18 PM        Passed - Medication is active on med list        Passed - Patient is 18 years of age or older        Passed - Recent (6 mo) or future (30 days) visit within the authorizing provider's specialty     Patient had office visit in the last 6 months or has a visit in the next 30 days with authorizing provider.  See \"Patient Info\" tab in inbasket, or \"Choose Columns\" in Meds & Orders section of the refill encounter.                 Jaylin Mcarthur RN 03/24/23 1:07 PM      "

## 2023-03-25 RX ORDER — LANCETS
EACH MISCELLANEOUS
Qty: 100 EACH | Refills: 4 | Status: SHIPPED | OUTPATIENT
Start: 2023-03-25 | End: 2023-07-18

## 2023-03-25 NOTE — TELEPHONE ENCOUNTER
"Last Written Prescription Date:  1/27/2023  Last Fill Quantity: 100,  # refills: 1   Last office visit provider:  1/23/2023     Requested Prescriptions   Pending Prescriptions Disp Refills     blood glucose monitoring (SOFTCLIX) lancets [Pharmacy Med Name: SOFTCLIX LANCETS]       Sig: USE WITH LANCING DEVICE AS DIRECTED       Diabetic Supplies Protocol Passed - 3/25/2023  9:10 AM        Passed - Medication is active on med list        Passed - Patient is 18 years of age or older        Passed - Recent (6 mo) or future (30 days) visit within the authorizing provider's specialty     Patient had office visit in the last 6 months or has a visit in the next 30 days with authorizing provider.  See \"Patient Info\" tab in inbasket, or \"Choose Columns\" in Meds & Orders section of the refill encounter.                 Meka Springer RN 03/25/23 9:10 AM  "

## 2023-04-19 DIAGNOSIS — E11.65 TYPE 2 DIABETES MELLITUS WITH HYPERGLYCEMIA, WITH LONG-TERM CURRENT USE OF INSULIN (H): ICD-10-CM

## 2023-04-19 DIAGNOSIS — Z79.4 TYPE 2 DIABETES MELLITUS WITH HYPERGLYCEMIA, WITH LONG-TERM CURRENT USE OF INSULIN (H): ICD-10-CM

## 2023-04-20 RX ORDER — INSULIN ASPART 100 [IU]/ML
INJECTION, SOLUTION INTRAVENOUS; SUBCUTANEOUS
Qty: 15 ML | Refills: 1 | Status: SHIPPED | OUTPATIENT
Start: 2023-04-20 | End: 2023-06-12

## 2023-04-20 NOTE — TELEPHONE ENCOUNTER
"Last Written Prescription Date:  2/28/2023  Last Fill Quantity: 15mL ,  # refills: 1   Last office visit provider:  1/23/2023     Pt will need new A1C labs as of 7-    Requested Prescriptions   Pending Prescriptions Disp Refills     Insulin Aspart FlexPen 100 UNIT/ML SOPN [Pharmacy Med Name: INSULIN ASPART FLEXPEN INJ, 3ML] 15 mL 1     Sig: ADMINISTER 27 UNITS UNDER THE SKIN TWICE DAILY WITH MEALS       Short Acting Insulin Protocol Passed - 4/19/2023  3:54 PM        Passed - Serum creatinine on file in past 12 months     Recent Labs   Lab Test 01/23/23  1348   CR 0.86       Ok to refill medication if creatinine is low          Passed - HgbA1C in past 3 or 6 months     If HgbA1C is 8 or greater, it needs to be on file within the past 3 months.  If less than 8, must be on file within the past 6 months.     Recent Labs   Lab Test 01/23/23  1348   A1C 7.6*             Passed - Medication is active on med list        Passed - Patient is age 18 or older        Passed - Recent (6 mo) or future (30 days) visit within the authorizing provider's specialty     Patient had office visit in the last 6 months or has a visit in the next 30 days with authorizing provider or within the authorizing provider's specialty.  See \"Patient Info\" tab in inbasket, or \"Choose Columns\" in Meds & Orders section of the refill encounter.                 Rosa Hartley RN 04/20/23 2:45 PM  "

## 2023-05-22 ENCOUNTER — OFFICE VISIT (OUTPATIENT)
Dept: FAMILY MEDICINE | Facility: CLINIC | Age: 48
End: 2023-05-22
Payer: COMMERCIAL

## 2023-05-22 ENCOUNTER — LAB (OUTPATIENT)
Dept: FAMILY MEDICINE | Facility: CLINIC | Age: 48
End: 2023-05-22

## 2023-05-22 VITALS
SYSTOLIC BLOOD PRESSURE: 122 MMHG | HEIGHT: 71 IN | OXYGEN SATURATION: 98 % | TEMPERATURE: 97.7 F | HEART RATE: 83 BPM | BODY MASS INDEX: 31.92 KG/M2 | WEIGHT: 228 LBS | DIASTOLIC BLOOD PRESSURE: 80 MMHG

## 2023-05-22 DIAGNOSIS — E11.65 TYPE 2 DIABETES MELLITUS WITH HYPERGLYCEMIA, WITH LONG-TERM CURRENT USE OF INSULIN (H): ICD-10-CM

## 2023-05-22 DIAGNOSIS — Z12.11 SCREEN FOR COLON CANCER: ICD-10-CM

## 2023-05-22 DIAGNOSIS — Z79.4 TYPE 2 DIABETES MELLITUS WITH HYPERGLYCEMIA, WITH LONG-TERM CURRENT USE OF INSULIN (H): ICD-10-CM

## 2023-05-22 LAB
ANION GAP SERPL CALCULATED.3IONS-SCNC: 12 MMOL/L (ref 7–15)
BUN SERPL-MCNC: 18.3 MG/DL (ref 6–20)
CALCIUM SERPL-MCNC: 9.3 MG/DL (ref 8.6–10)
CHLORIDE SERPL-SCNC: 106 MMOL/L (ref 98–107)
CREAT SERPL-MCNC: 0.95 MG/DL (ref 0.67–1.17)
DEPRECATED HCO3 PLAS-SCNC: 25 MMOL/L (ref 22–29)
GFR SERPL CREATININE-BSD FRML MDRD: >90 ML/MIN/1.73M2
GLUCOSE SERPL-MCNC: 66 MG/DL (ref 70–99)
HBA1C MFR BLD: 7.4 % (ref 0–5.6)
POTASSIUM SERPL-SCNC: 3.9 MMOL/L (ref 3.4–5.3)
SODIUM SERPL-SCNC: 143 MMOL/L (ref 136–145)

## 2023-05-22 PROCEDURE — 83036 HEMOGLOBIN GLYCOSYLATED A1C: CPT | Performed by: FAMILY MEDICINE

## 2023-05-22 PROCEDURE — 80048 BASIC METABOLIC PNL TOTAL CA: CPT | Performed by: FAMILY MEDICINE

## 2023-05-22 PROCEDURE — 36415 COLL VENOUS BLD VENIPUNCTURE: CPT | Performed by: FAMILY MEDICINE

## 2023-05-22 PROCEDURE — 99214 OFFICE O/P EST MOD 30 MIN: CPT | Performed by: FAMILY MEDICINE

## 2023-05-22 PROCEDURE — 99207 PR FOOT EXAM NO CHARGE: CPT | Performed by: FAMILY MEDICINE

## 2023-05-22 RX ORDER — ATORVASTATIN CALCIUM 40 MG/1
40 TABLET, FILM COATED ORAL DAILY
Qty: 90 TABLET | Refills: 1 | Status: SHIPPED | OUTPATIENT
Start: 2023-05-22 | End: 2024-06-10

## 2023-05-22 NOTE — PROGRESS NOTES
"  Assessment & Plan     Type 2 diabetes mellitus with hyperglycemia, with long-term current use of insulin (H)  Reasonable control, though improved glucose control and A1c <7 wouiild be desirable.  Filled form for insulin treated DM in regard to his driving.  - Adult Eye  Referral  - Hemoglobin A1c  - Basic metabolic panel  (Ca, Cl, CO2, Creat, Gluc, K, Na, BUN)  - atorvastatin (LIPITOR) 40 MG tablet  Dispense: 90 tablet; Refill: 1  - FOOT EXAM    Screen for colon cancer  Has previous orders, but says he has not received a kit.  Confirmed address.  Reordered.  - COLOGUARD([employer])    Total time exceeded 30 minutes, including record review, examination room time, form completion, and documentation.    BMI:   Estimated body mass index is 31.98 kg/m  as calculated from the following:    Height as of this encounter: 1.798 m (5' 10.8\").    Weight as of this encounter: 103.4 kg (228 lb).     Deepak Bob MD  [facility]   Valentin is a 48 year old, presenting for the following health issues:  Diabetes        5/22/2023     3:09 PM   Additional Questions   Roomed by Melissa     History of Present Illness       Reason for visit:  Check up    He eats 2-3 servings of fruits and vegetables daily.He consumes 0 sweetened beverage(s) daily.He exercises with enough effort to increase his heart rate 10 to 19 minutes per day.  He exercises with enough effort to increase his heart rate 5 days per week.   He is taking medications regularly.     Valentin comes for follow-up.  He reports he went to a 's license examination.  He was given approval for 3-month 's license.  States he could not get any more approval because of glycosuria.  I discussed with him that he is taking an SGLT2 medicine which is going to make him have glycosuria.  He had not disclosed that he has diabetes to the examiner.  He requested a form to be completed regarding his diabetes.    BP Readings from Last " "3 Encounters:   05/22/23 122/80   01/23/23 117/78   06/21/22 113/77     Lab Results   Component Value Date    A1C 7.4 05/22/2023    A1C 7.6 01/23/2023    A1C 6.3 06/21/2022     Recent Labs   Lab Test 01/23/23  1348 06/21/22  1026   CHOL 118 115   HDL 52 37*   LDL 54 54   TRIG 60 122     Wt Readings from Last 3 Encounters:   05/22/23 103.4 kg (228 lb)   01/23/23 97.5 kg (215 lb)   06/21/22 100.2 kg (221 lb)     Current Outpatient Medications   Medication Sig Dispense Refill     ACCU-CHEK GUIDE test strip USE TO TEST THREE TIMES DAILY OR AS DIRECTED 300 strip 0     alcohol swab prep pads Use to swab area of injection/keshav as directed. 100 each 11     atorvastatin (LIPITOR) 40 MG tablet Take 1 tablet (40 mg) by mouth daily 90 tablet 1     BD PEN NEEDLE TRISH 2ND GEN 32G X 4 MM miscellaneous USE WITH LANTUS AND NOVOLOG INSULIN 400 each 3     blood glucose monitoring (SOFTCLIX) lancets USE WITH LANCING DEVICE AS DIRECTED 100 each 4     Insulin Aspart FlexPen 100 UNIT/ML SOPN ADMINISTER 27 UNITS UNDER THE SKIN TWICE DAILY WITH MEALS 15 mL 1     insulin glargine (LANTUS SOLOSTAR) 100 UNIT/ML pen Inject 47 Units Subcutaneous At Bedtime 45 mL 1     JARDIANCE 25 MG TABS tablet TAKE 1 TABLET(25 MG) BY MOUTH DAILY 90 tablet 1     metFORMIN (GLUCOPHAGE-XR) 750 MG 24 hr tablet TAKE 2 TABLETS(1500 MG) BY MOUTH DAILY WITH DINNER 180 tablet 2     MICROLET LANCET Misc [MICROLET LANCET MISC]            Objective    /80 (BP Location: Left arm, Patient Position: Sitting, Cuff Size: Adult Large)   Pulse 83   Temp 97.7  F (36.5  C) (Temporal)   Ht 1.798 m (5' 10.8\")   Wt 103.4 kg (228 lb)   SpO2 98%   BMI 31.98 kg/m    Body mass index is 31.98 kg/m .  Physical Exam   GENERAL: alert, not distressed  CHEST: clear, no rales, rhonchi, or wheezes  CARDIAC: regular without murmur, gallop, or rub    Diabetic foot exam:  Dorsalis pedis and posterior tibialis pulses are normal.  Hair growth on the feet is absent.  Skin is without " breakdown.  Capillary refill is normal.  No hyperkeratotic lesions.  Sensory testing with monofilament is normal.      Results for orders placed or performed in visit on 05/22/23   Hemoglobin A1c     Status: Abnormal   Result Value Ref Range    Hemoglobin A1C 7.4 (H) 0.0 - 5.6 %   Basic metabolic panel  (Ca, Cl, CO2, Creat, Gluc, K, Na, BUN)     Status: Abnormal   Result Value Ref Range    Sodium 143 136 - 145 mmol/L    Potassium 3.9 3.4 - 5.3 mmol/L    Chloride 106 98 - 107 mmol/L    Carbon Dioxide (CO2) 25 22 - 29 mmol/L    Anion Gap 12 7 - 15 mmol/L    Urea Nitrogen 18.3 6.0 - 20.0 mg/dL    Creatinine 0.95 0.67 - 1.17 mg/dL    Calcium 9.3 8.6 - 10.0 mg/dL    Glucose 66 (L) 70 - 99 mg/dL    GFR Estimate >90 >60 mL/min/1.73m2

## 2023-05-23 ENCOUNTER — TELEPHONE (OUTPATIENT)
Dept: FAMILY MEDICINE | Facility: CLINIC | Age: 48
End: 2023-05-23
Payer: COMMERCIAL

## 2023-05-23 NOTE — TELEPHONE ENCOUNTER
Contacted patient with message below from Dr Bob.  No further action needed.    Sun Rust RN  [facility]      ----- Message -----  From: Deepak Bob MD  Sent: 5/23/2023  12:35 PM CDT  To: [facility] Care Team Pool    Call:  Blood sugar control looks okay.  Having your average sugars be a little bit lower so the A1c level can be less than 7 would be ideal.

## 2023-06-12 DIAGNOSIS — Z79.4 TYPE 2 DIABETES MELLITUS WITH HYPERGLYCEMIA, WITH LONG-TERM CURRENT USE OF INSULIN (H): ICD-10-CM

## 2023-06-12 DIAGNOSIS — E11.65 TYPE 2 DIABETES MELLITUS WITH HYPERGLYCEMIA, WITH LONG-TERM CURRENT USE OF INSULIN (H): ICD-10-CM

## 2023-06-12 RX ORDER — INSULIN ASPART 100 [IU]/ML
INJECTION, SOLUTION INTRAVENOUS; SUBCUTANEOUS
Qty: 15 ML | Refills: 1 | Status: SHIPPED | OUTPATIENT
Start: 2023-06-12 | End: 2023-08-06

## 2023-06-13 NOTE — TELEPHONE ENCOUNTER
"Last Written Prescription Date:  4/20/2023  Last Fill Quantity: 15ml,  # refills: 1   Last office visit provider:  5/22/2023     Requested Prescriptions   Pending Prescriptions Disp Refills     Insulin Aspart FlexPen 100 UNIT/ML SOPN [Pharmacy Med Name: INSULIN ASPART FLEXPEN INJ, 3ML] 15 mL 1     Sig: ADMINISTER 27 UNITS UNDER THE SKIN TWICE DAILY WITH MEALS       Short Acting Insulin Protocol Passed - 6/12/2023 12:05 PM        Passed - Serum creatinine on file in past 12 months     Recent Labs   Lab Test 05/22/23  1537   CR 0.95       Ok to refill medication if creatinine is low          Passed - HgbA1C in past 3 or 6 months     If HgbA1C is 8 or greater, it needs to be on file within the past 3 months.  If less than 8, must be on file within the past 6 months.     Recent Labs   Lab Test 05/22/23  1537   A1C 7.4*             Passed - Medication is active on med list        Passed - Patient is age 18 or older        Passed - Recent (6 mo) or future (30 days) visit within the authorizing provider's specialty     Patient had office visit in the last 6 months or has a visit in the next 30 days with authorizing provider or within the authorizing provider's specialty.  See \"Patient Info\" tab in inbasket, or \"Choose Columns\" in Meds & Orders section of the refill encounter.                 Junie Rodriguez RN 06/12/23 7:21 PM  "

## 2023-07-15 DIAGNOSIS — E11.65 TYPE 2 DIABETES MELLITUS WITH HYPERGLYCEMIA, WITH LONG-TERM CURRENT USE OF INSULIN (H): ICD-10-CM

## 2023-07-15 DIAGNOSIS — Z79.4 TYPE 2 DIABETES MELLITUS WITH HYPERGLYCEMIA, WITH LONG-TERM CURRENT USE OF INSULIN (H): ICD-10-CM

## 2023-07-15 RX ORDER — INSULIN GLARGINE 100 [IU]/ML
INJECTION, SOLUTION SUBCUTANEOUS
Qty: 45 ML | Refills: 1 | Status: SHIPPED | OUTPATIENT
Start: 2023-07-15 | End: 2024-07-02

## 2023-07-15 NOTE — TELEPHONE ENCOUNTER
"    Last Written Prescription Date:  1/23/23  Last Fill Quantity: 45 ml,  # refills: 1   Last office visit provider:  5/22/23     Requested Prescriptions   Pending Prescriptions Disp Refills     LANTUS SOLOSTAR 100 UNIT/ML soln [Pharmacy Med Name: LANTUS SOLOSTAR PEN INJ 3ML] 45 mL 1     Sig: ADMINISTER 47 UNITS UNDER THE SKIN AT BEDTIME       Long Acting Insulin Protocol Passed - 7/15/2023  2:49 PM        Passed - Serum creatinine on file in past 12 months     Recent Labs   Lab Test 05/22/23  1537   CR 0.95       Ok to refill medication if creatinine is low          Passed - HgbA1C in past 3 or 6 months     If HgbA1C is 8 or greater, it needs to be on file within the past 3 months.  If less than 8, must be on file within the past 6 months.     Recent Labs   Lab Test 05/22/23  1537   A1C 7.4*             Passed - Medication is active on med list        Passed - Patient is age 18 or older        Passed - Recent (6 mo) or future (30 days) visit within the authorizing provider's specialty     Patient had office visit in the last 6 months or has a visit in the next 30 days with authorizing provider or within the authorizing provider's specialty.  See \"Patient Info\" tab in inbasket, or \"Choose Columns\" in Meds & Orders section of the refill encounter.                 Winter Dempsey RN 07/15/23 2:49 PM  "

## 2023-07-18 DIAGNOSIS — E11.65 TYPE 2 DIABETES MELLITUS WITH HYPERGLYCEMIA, WITH LONG-TERM CURRENT USE OF INSULIN (H): ICD-10-CM

## 2023-07-18 DIAGNOSIS — Z79.4 TYPE 2 DIABETES MELLITUS WITH HYPERGLYCEMIA, WITH LONG-TERM CURRENT USE OF INSULIN (H): ICD-10-CM

## 2023-07-18 RX ORDER — LANCETS
EACH MISCELLANEOUS
Qty: 200 EACH | Refills: 4 | Status: SHIPPED | OUTPATIENT
Start: 2023-07-18

## 2023-07-18 NOTE — TELEPHONE ENCOUNTER
"Routing refill request to provider for review/approval because:  Early refill request    Last Written Prescription Date:  3/25/2023  Last Fill Quantity: 100,  # refills: 4   Last office visit provider:  5/22/2023     Requested Prescriptions   Pending Prescriptions Disp Refills     blood glucose monitoring (SOFTCLIX) lancets [Pharmacy Med Name: SOFTCLIX LANCETS]       Sig: USE LANCING DEVICE AS DIRECTED THREE TIMES DAILY       Diabetic Supplies Protocol Passed - 7/18/2023  8:07 AM        Passed - Medication is active on med list        Passed - Patient is 18 years of age or older        Passed - Recent (6 mo) or future (30 days) visit within the authorizing provider's specialty     Patient had office visit in the last 6 months or has a visit in the next 30 days with authorizing provider.  See \"Patient Info\" tab in inbasket, or \"Choose Columns\" in Meds & Orders section of the refill encounter.                 Angélica Jones RN 07/18/23 3:43 PM  "

## 2023-08-06 DIAGNOSIS — Z79.4 TYPE 2 DIABETES MELLITUS WITH HYPERGLYCEMIA, WITH LONG-TERM CURRENT USE OF INSULIN (H): ICD-10-CM

## 2023-08-06 DIAGNOSIS — E11.65 TYPE 2 DIABETES MELLITUS WITH HYPERGLYCEMIA, WITH LONG-TERM CURRENT USE OF INSULIN (H): ICD-10-CM

## 2023-08-06 RX ORDER — INSULIN ASPART 100 [IU]/ML
INJECTION, SOLUTION INTRAVENOUS; SUBCUTANEOUS
Qty: 15 ML | Refills: 0 | Status: SHIPPED | OUTPATIENT
Start: 2023-08-06 | End: 2023-08-31

## 2023-08-07 NOTE — TELEPHONE ENCOUNTER
"Last Written Prescription Date:  6/12/2023  Last Fill Quantity: 15ml,  # refills: 0   Last office visit provider:  5/22/2023     Requested Prescriptions   Pending Prescriptions Disp Refills    Insulin Aspart FlexPen 100 UNIT/ML SOPN [Pharmacy Med Name: INSULIN ASPART FLEXPEN INJ, 3ML] 15 mL 1     Sig: ADMINISTER 27 UNITS UNDER THE SKIN TWICE DAILY WITH MEALS       Short Acting Insulin Protocol Passed - 8/6/2023  4:20 PM        Passed - Serum creatinine on file in past 12 months     Recent Labs   Lab Test 05/22/23  1537   CR 0.95       Ok to refill medication if creatinine is low          Passed - HgbA1C in past 3 or 6 months     If HgbA1C is 8 or greater, it needs to be on file within the past 3 months.  If less than 8, must be on file within the past 6 months.     Recent Labs   Lab Test 05/22/23  1537   A1C 7.4*             Passed - Medication is active on med list        Passed - Patient is age 18 or older        Passed - Recent (6 mo) or future (30 days) visit within the authorizing provider's specialty     Patient had office visit in the last 6 months or has a visit in the next 30 days with authorizing provider or within the authorizing provider's specialty.  See \"Patient Info\" tab in inbasket, or \"Choose Columns\" in Meds & Orders section of the refill encounter.                 Junie Rodriguez RN 08/06/23 11:17 PM  "

## 2023-08-11 DIAGNOSIS — E11.65 TYPE 2 DIABETES MELLITUS WITH HYPERGLYCEMIA, WITH LONG-TERM CURRENT USE OF INSULIN (H): ICD-10-CM

## 2023-08-11 DIAGNOSIS — Z79.4 TYPE 2 DIABETES MELLITUS WITH HYPERGLYCEMIA, WITH LONG-TERM CURRENT USE OF INSULIN (H): ICD-10-CM

## 2023-08-11 RX ORDER — BLOOD SUGAR DIAGNOSTIC
STRIP MISCELLANEOUS
Qty: 300 STRIP | Refills: 1 | Status: SHIPPED | OUTPATIENT
Start: 2023-08-11

## 2023-08-11 NOTE — TELEPHONE ENCOUNTER
"Routing refill request to provider for review/approval because:  A break in medication    Last Written Prescription Date:  1/10/23  Last Fill Quantity: 300,  # refills: 0   Last office visit provider:  5/22/23     Requested Prescriptions   Pending Prescriptions Disp Refills    ACCU-CHEK GUIDE test strip [Pharmacy Med Name: ACCU-CHEK GUIDE TEST STRIPS 100S] 300 strip 0     Sig: USE TO TEST THREE TIMES DAILY OR AS DIRECTED       Diabetic Supplies Protocol Passed - 8/11/2023 12:46 PM        Passed - Medication is active on med list        Passed - Patient is 18 years of age or older        Passed - Recent (6 mo) or future (30 days) visit within the authorizing provider's specialty     Patient had office visit in the last 6 months or has a visit in the next 30 days with authorizing provider.  See \"Patient Info\" tab in inbasket, or \"Choose Columns\" in Meds & Orders section of the refill encounter.                 Marco Seymour RN 08/11/23 2:36 PM  "

## 2023-08-12 DIAGNOSIS — Z79.4 TYPE 2 DIABETES MELLITUS WITH HYPERGLYCEMIA, WITH LONG-TERM CURRENT USE OF INSULIN (H): ICD-10-CM

## 2023-08-12 DIAGNOSIS — E11.65 TYPE 2 DIABETES MELLITUS WITH HYPERGLYCEMIA, WITH LONG-TERM CURRENT USE OF INSULIN (H): ICD-10-CM

## 2023-08-12 RX ORDER — EMPAGLIFLOZIN 25 MG/1
TABLET, FILM COATED ORAL
Qty: 90 TABLET | Refills: 2 | Status: SHIPPED | OUTPATIENT
Start: 2023-08-12 | End: 2024-06-10

## 2023-08-12 NOTE — TELEPHONE ENCOUNTER
"Last Written Prescription Date:  2/27/23  Last Fill Quantity: 90,  # refills: 1   Last office visit provider:  5/22/23     Requested Prescriptions   Pending Prescriptions Disp Refills    JARDIANCE 25 MG TABS tablet [Pharmacy Med Name: JARDIANCE 25MG TABLETS] 90 tablet 1     Sig: TAKE 1 TABLET(25 MG) BY MOUTH DAILY       Sodium Glucose Co-Transport Inhibitor Agents Passed - 8/12/2023  8:07 AM        Passed - Patient has documented A1c within the specified period of time.     If HgbA1C is 8 or greater, it needs to be on file within the past 3 months.  If less than 8, must be on file within the past 6 months.     Recent Labs   Lab Test 05/22/23  1537   A1C 7.4*             Passed - No creatinine >1.4 or GFR <45 within the past 12 mos     Recent Labs   Lab Test 05/22/23  1537 09/24/21  1101 05/12/21  0942   GFRESTIMATED >90   < > >60   GFRESTBLACK  --   --  >60    < > = values in this interval not displayed.       Recent Labs   Lab Test 05/22/23  1537   CR 0.95             Passed - Medication is active on med list        Passed - Patient is age 18 or older        Passed - Patient has documented normal Potassium within the last 12 mos.     Recent Labs   Lab Test 05/22/23  1537   POTASSIUM 3.9             Passed - Recent (6 mo) or future (30 days) visit within the authorizing provider's specialty     Patient had office visit in the last 6 months or has a visit in the next 30 days with authorizing provider or within the authorizing provider's specialty.  See \"Patient Info\" tab in inbasket, or \"Choose Columns\" in Meds & Orders section of the refill encounter.                 Jenna Edmondson 08/12/23 8:22 AM  "

## 2023-08-31 DIAGNOSIS — E11.65 TYPE 2 DIABETES MELLITUS WITH HYPERGLYCEMIA, WITH LONG-TERM CURRENT USE OF INSULIN (H): ICD-10-CM

## 2023-08-31 DIAGNOSIS — Z79.4 TYPE 2 DIABETES MELLITUS WITH HYPERGLYCEMIA, WITH LONG-TERM CURRENT USE OF INSULIN (H): ICD-10-CM

## 2023-08-31 RX ORDER — INSULIN ASPART 100 [IU]/ML
INJECTION, SOLUTION INTRAVENOUS; SUBCUTANEOUS
Qty: 45 ML | Refills: 1 | Status: SHIPPED | OUTPATIENT
Start: 2023-08-31

## 2023-08-31 NOTE — TELEPHONE ENCOUNTER
"    Last Written Prescription Date:  8/6/23  Last Fill Quantity: 15 ml,  # refills: 0   Last office visit provider:  5/22/23     Requested Prescriptions   Pending Prescriptions Disp Refills    Insulin Aspart FlexPen 100 UNIT/ML SOPN [Pharmacy Med Name: INSULIN ASPART FLEXPEN INJ, 3ML] 15 mL 0     Sig: ADMINISTER 27 UNITS UNDER THE SKIN TWICE DAILY WITH MEALS       Short Acting Insulin Protocol Passed - 8/31/2023  2:42 PM        Passed - Serum creatinine on file in past 12 months     Recent Labs   Lab Test 05/22/23  1537   CR 0.95       Ok to refill medication if creatinine is low          Passed - HgbA1C in past 3 or 6 months     If HgbA1C is 8 or greater, it needs to be on file within the past 3 months.  If less than 8, must be on file within the past 6 months.     Recent Labs   Lab Test 05/22/23  1537   A1C 7.4*             Passed - Medication is active on med list        Passed - Patient is age 18 or older        Passed - Recent (6 mo) or future (30 days) visit within the authorizing provider's specialty     Patient had office visit in the last 6 months or has a visit in the next 30 days with authorizing provider or within the authorizing provider's specialty.  See \"Patient Info\" tab in inbasket, or \"Choose Columns\" in Meds & Orders section of the refill encounter.                 Winter Dempsey RN 08/31/23 2:42 PM  "

## 2023-12-04 DIAGNOSIS — Z79.4 TYPE 2 DIABETES MELLITUS WITH HYPERGLYCEMIA, WITH LONG-TERM CURRENT USE OF INSULIN (H): ICD-10-CM

## 2023-12-04 DIAGNOSIS — E11.65 TYPE 2 DIABETES MELLITUS WITH HYPERGLYCEMIA, WITH LONG-TERM CURRENT USE OF INSULIN (H): ICD-10-CM

## 2023-12-04 RX ORDER — METFORMIN HYDROCHLORIDE 750 MG/1
TABLET, EXTENDED RELEASE ORAL
Qty: 180 TABLET | Refills: 2 | Status: SHIPPED | OUTPATIENT
Start: 2023-12-04

## 2023-12-04 NOTE — LETTER
December 4, 2023      Valentin Cartagena  [address] [place] [address]        Dear Valentin,     As a valued [facility] patient, your healthcare needs are our priority.    Your health care team has determined that you are due for an appointment  for a check up on your medications, without this we can't refill medications .   We encourage you to call or schedule an appointment with your primary care provider to discuss your overdue screening and schedule an appointment.     If you already have had your screening performed at another health care facility, please ask that practice to send your results to [facility] [phone] and we will update your health records. This will ensure you receive the best possible care from our providers.      If you have any questions or need help with scheduling, please call the [facility] at [phone].        Yours in health,       Your care team at [facility]

## 2023-12-04 NOTE — TELEPHONE ENCOUNTER
Tried to call the pt's phone number x2, the phone would ring and then it would go to the busy tone. Sending a letter to the patient.

## 2024-04-19 ENCOUNTER — TRANSFERRED RECORDS (OUTPATIENT)
Dept: MULTI SPECIALTY CLINIC | Facility: CLINIC | Age: 49
End: 2024-04-19

## 2024-04-19 LAB — RETINOPATHY: NORMAL

## 2024-05-21 DIAGNOSIS — E11.9 TYPE 2 DIABETES MELLITUS WITHOUT COMPLICATION, WITH LONG-TERM CURRENT USE OF INSULIN (H): ICD-10-CM

## 2024-05-21 DIAGNOSIS — Z79.4 TYPE 2 DIABETES MELLITUS WITHOUT COMPLICATION, WITH LONG-TERM CURRENT USE OF INSULIN (H): ICD-10-CM

## 2024-05-21 RX ORDER — PEN NEEDLE, DIABETIC 32GX 5/32"
NEEDLE, DISPOSABLE MISCELLANEOUS
Qty: 200 EACH | Refills: 1 | Status: SHIPPED | OUTPATIENT
Start: 2024-05-21

## 2024-06-10 ENCOUNTER — OFFICE VISIT (OUTPATIENT)
Dept: FAMILY MEDICINE | Facility: CLINIC | Age: 49
End: 2024-06-10
Payer: COMMERCIAL

## 2024-06-10 VITALS
TEMPERATURE: 97.4 F | SYSTOLIC BLOOD PRESSURE: 120 MMHG | DIASTOLIC BLOOD PRESSURE: 78 MMHG | HEIGHT: 70 IN | BODY MASS INDEX: 30.06 KG/M2 | RESPIRATION RATE: 18 BRPM | WEIGHT: 210 LBS | OXYGEN SATURATION: 96 % | HEART RATE: 86 BPM

## 2024-06-10 DIAGNOSIS — I10 ESSENTIAL HYPERTENSION: ICD-10-CM

## 2024-06-10 DIAGNOSIS — Z79.4 TYPE 2 DIABETES MELLITUS WITH HYPERGLYCEMIA, WITH LONG-TERM CURRENT USE OF INSULIN (H): Primary | ICD-10-CM

## 2024-06-10 DIAGNOSIS — Z12.11 SCREEN FOR COLON CANCER: ICD-10-CM

## 2024-06-10 DIAGNOSIS — E11.65 TYPE 2 DIABETES MELLITUS WITH HYPERGLYCEMIA, WITH LONG-TERM CURRENT USE OF INSULIN (H): Primary | ICD-10-CM

## 2024-06-10 PROCEDURE — 99207 PR FOOT EXAM NO CHARGE: CPT | Performed by: FAMILY MEDICINE

## 2024-06-10 PROCEDURE — 99214 OFFICE O/P EST MOD 30 MIN: CPT | Performed by: FAMILY MEDICINE

## 2024-06-10 PROCEDURE — G2211 COMPLEX E/M VISIT ADD ON: HCPCS | Performed by: FAMILY MEDICINE

## 2024-06-10 RX ORDER — ATORVASTATIN CALCIUM 40 MG/1
40 TABLET, FILM COATED ORAL DAILY
Qty: 90 TABLET | Refills: 1 | Status: SHIPPED | OUTPATIENT
Start: 2024-06-10

## 2024-06-10 RX ORDER — ASPIRIN 81 MG/1
81 TABLET ORAL DAILY
Qty: 90 TABLET | Refills: 1 | Status: SHIPPED | OUTPATIENT
Start: 2024-06-10

## 2024-06-10 NOTE — PROGRESS NOTES
"  Assessment & Plan     Type 2 diabetes mellitus with hyperglycemia, with long-term current use of insulin (H)  Will need to recheck labs.  He is unable to stay for blood draw today but he does state that he will return soon to have blood test done.    I discussed with him that unfortunately I do not think he will be able to have glycemic control without the use of insulin.  - FOOT EXAM  - Adult Eye  Referral  - HEMOGLOBIN A1C  - Lipid panel reflex to direct LDL Non-fasting  - Albumin Random Urine Quantitative with Creat Ratio  - atorvastatin (LIPITOR) 40 MG tablet  Dispense: 90 tablet; Refill: 1  - aspirin 81 MG EC tablet  Dispense: 90 tablet; Refill: 1  - empagliflozin (JARDIANCE) 25 MG TABS tablet  Dispense: 90 tablet; Refill: 2    Screen for colon cancer  Methods of screening for colon cancer discussed.  He wanted to proceed with FIT testing even after discussion of benefits of other options.  - Fecal colorectal cancer screen (FIT)    Essential hypertension  Controlled today.  - BASIC METABOLIC PANEL    He declined vaccines including COVID, hepatitis B, zoster.    The longitudinal plan of care for the diagnosis(es)/condition(s) as documented were addressed during this visit. Due to the added complexity in care, I will continue to support Valentin in the subsequent management and with ongoing continuity of care.       BMI  Estimated body mass index is 30.13 kg/m  as calculated from the following:    Height as of this encounter: 1.778 m (5' 10\").    Weight as of this encounter: 95.3 kg (210 lb).       Deya Hanson is a 49 year old, presenting for the following health issues:  Follow Up (Diabetes )      6/10/2024     4:18 PM   Additional Questions   Roomed by hser   Accompanied by self     Via the Health Maintenance questionnaire, the patient has reported the following services have been completed -Eye Exam: [facility] 2024-04-19, this information has been sent to the abstraction " "team.    History of Present Illness       Diabetes:   He presents for follow up of diabetes.  He is checking home blood glucose two times daily.   He checks blood glucose before meals.  Blood glucose is sometimes over 200 and sometimes under 70. He is aware of hypoglycemia symptoms including weakness.   He is concerned about other.    He is not experiencing numbness or burning in feet, excessive thirst, blurry vision, weight changes or redness, sores or blisters on feet. The patient has had a diabetic eye exam in the last 12 months. Eye exam performed on [id number]. [facility]        He eats 2-3 servings of fruits and vegetables daily.He consumes 0 sweetened beverage(s) daily.He exercises with enough effort to increase his heart rate 9 or less minutes per day.  He exercises with enough effort to increase his heart rate 4 days per week.   He is taking medications regularly.     -year-old man has returned from [place] as he has want to do.  [place]  States that he has been on a sliding scale insulin.  Using NovoLog 4 units for sugars 141-180, 6 units for 181-240, 8 units for 2 1-300, 10 units with 301 to 350, 12 units for 351-400.  And 14 units above 400.  He adds this to 47 units of basal insulin (Lantus).    He hopes he could get back on only oral medication.  Unfortunately he is also taking metformin and Jardiance already.    Has not had recent eye exam.    Reports aspirin and atorvastatin are current medicines as well.        Objective    /78 (BP Location: Left arm, Patient Position: Sitting, Cuff Size: Adult Regular)   Pulse 86   Temp 97.4  F (36.3  C) (Temporal)   Resp 18   Ht 1.778 m (5' 10\")   Wt 95.3 kg (210 lb)   SpO2 96%   BMI 30.13 kg/m    Body mass index is 30.13 kg/m .  Physical Exam   GENERAL: alert, not distressed  CHEST: clear, no rales, rhonchi, or wheezes  CARDIAC: regular without " murmur, gallop, or rub  ABDOMEN: soft, non tender, non distended, normal bowel sounds    FOOT EXAM:  DP and PT pulses are normal.  Monofilament testing normal  Nails normal.  Hair growth absent.  No skin changes          Signed Electronically by: Deepak Bob MD        Prior to immunization administration, verified patients identity using patient s name and date of birth. Please see Immunization Activity for additional information.     Screening Questionnaire for Adult Immunization    Are you sick today?   No   Do you have allergies to medications, food, a vaccine component or latex?   No   Have you ever had a serious reaction after receiving a vaccination?   No   Do you have a long-term health problem with heart, lung, kidney, or metabolic disease (e.g., diabetes), asthma, a blood disorder, no spleen, complement component deficiency, a cochlear implant, or a spinal fluid leak?  Are you on long-term aspirin therapy?   Yes   Do you have cancer, leukemia, HIV/AIDS, or any other immune system problem?   No   Do you have a parent, brother, or sister with an immune system problem?   No   In the past 3 months, have you taken medications that affect  your immune system, such as prednisone, other steroids, or anticancer drugs; drugs for the treatment of rheumatoid arthritis, Crohn s disease, or psoriasis; or have you had radiation treatments?   No   Have you had a seizure, or a brain or other nervous system problem?   No   During the past year, have you received a transfusion of blood or blood    products, or been given immune (gamma) globulin or antiviral drug?   No   For women: Are you pregnant or is there a chance you could become       pregnant during the next month?   No   Have you received any vaccinations in the past 4 weeks?   No     Immunization questionnaire was positive for at least one answer.  Notified provider.      Patient instructed to remain in clinic for 15 minutes afterwards, and to report any adverse  reactions.     Screening performed by Mary Retana MA on 6/10/2024 at 4:26 PM.

## 2024-07-02 ENCOUNTER — NURSE TRIAGE (OUTPATIENT)
Dept: NURSING | Facility: CLINIC | Age: 49
End: 2024-07-02
Payer: COMMERCIAL

## 2024-07-02 DIAGNOSIS — E11.65 TYPE 2 DIABETES MELLITUS WITH HYPERGLYCEMIA, WITH LONG-TERM CURRENT USE OF INSULIN (H): ICD-10-CM

## 2024-07-02 DIAGNOSIS — Z79.4 TYPE 2 DIABETES MELLITUS WITH HYPERGLYCEMIA, WITH LONG-TERM CURRENT USE OF INSULIN (H): ICD-10-CM

## 2024-07-03 RX ORDER — INSULIN GLARGINE 100 [IU]/ML
INJECTION, SOLUTION SUBCUTANEOUS
Qty: 45 ML | Refills: 0 | Status: SHIPPED | OUTPATIENT
Start: 2024-07-03

## 2024-07-03 NOTE — TELEPHONE ENCOUNTER
Patient is calling and is needing a new prescription for his Lantus Solostar 100 Unit/ml soln.  Patient is requesting refill for tomorrow as he will be out and is needing this before the 4th of July.  Pharmacy is [facility] on [address] [facility] [address]

## 2024-07-03 NOTE — TELEPHONE ENCOUNTER
Patient is calling and is requesting a new prescription for his Lantus Solostar 100 Unit/ml soln and to administer 47 units under the skin at bedtime.  Patient is requesting this for tomorrow before the holiday as he will be out.  [facility] sent prescription to refill line.  Pharmacy is [facility] on [address]    Reason for Disposition   Caller with prescription and triager answers question    Additional Information   Negative: New-onset or worsening symptoms, see that guideline (e.g., diarrhea, runny nose, sore throat)   Negative: Medicine question not related to refill or renewal   Negative: Caller (e.g., patient or pharmacist) requesting information about a new medicine   Negative: Caller requesting information unrelated to medicine   Negative: [1] Prescription refill request for ESSENTIAL medicine (i.e., likelihood of harm to patient if not taken) AND [2] triager unable to refill per department policy   Negative: [1] Prescription not at pharmacy AND [2] was prescribed by PCP recently  (Exception: Triager has access to EMR and prescription is recorded there. Go to Home Care and confirm for pharmacy.)   Negative: [1] Pharmacy calling with prescription questions AND [2] triager unable to answer question   Negative: Prescription request for new medicine (not a refill)   Negative: Caller requesting a CONTROLLED substance prescription refill (e.g., narcotics, ADHD medicines)   Negative: [1] Prescription refill request for NON-ESSENTIAL medicine (i.e., no harm to patient if med not taken) AND [2] triager unable to refill per department policy   Negative: [1] Caller has NON-URGENT medicine question about med that PCP prescribed AND [2] triager unable to answer question   Negative: [1] Prescription prescribed recently is not at pharmacy AND [2] triager has access to patient's EMR AND [3] prescription is recorded in the EMR   Negative: [1] Caller requesting a prescription renewal (no refills left), no triage  required, AND [2] triager able to renew prescription per department policy   Negative: Patient has refills remaining on their prescription    Protocols used: Medication Refill and Renewal Call-A-AH

## 2024-07-03 NOTE — TELEPHONE ENCOUNTER
GFR Estimate   Date Value Ref Range Status   05/22/2023 >90 >60 mL/min/1.73m2 Final     Comment:     eGFR calculated using 2021 CKD-EPI equation.   05/12/2021 >60 >60 mL/min/1.73m2 Final     Most recent office visit with Dr. Bob was 6/10/24.    ANGEL DavisN, RN-[facility]

## 2025-01-05 NOTE — TELEPHONE ENCOUNTER
[facility]  Hospitalist Discharge Summary      Date of Admission:  1/1/2025  Date of Discharge:  1/5/2025  Discharging Provider: Germain Villareal DO  Discharge Service: Hospitalist Service    Discharge Diagnoses     Left hand cat bite  Flexor tenosynovitis of the second and third digits of the left hand s/p washout 1/2/25  Leukocytosis, improving  Mild Creatinine elevation, resolved  Hypertension  Tobacco use disorder    Clinically Significant Risk Factors     # Obesity: Estimated body mass index is 30.52 kg/m  as calculated from the following:    Height as of this encounter: 1.829 m (6').    Weight as of this encounter: 102.1 kg (225 lb).       Follow-ups Needed After Discharge   Follow-up Appointments       Follow Up      Please call on Monday, January 6th to schedule a follow up appointment with Dr. Ryanne Ruiz/Quiana Arellano PA-C ([facility]) at 1 week postop for wound care and reevaluation. Please call Dr. Ruiz's care coordinator, Heather, [phone] to set up the appointment or if any questions or concerns arise regarding your orthopedic injury/surgery. No tests/imaging are needed prior to this appointment.     If any questions arise after regular business hours or on weekends, please call the on-call orthopedic provider at [phone].        Hospital to Primary Care - Establish PCP Referral      Please be aware that coverage of these services is subject to the terms and limitations of your health insurance plan.  Call member services at your health plan with any benefit or coverage questions.    Schedule Primary Care visit within: 30 Days             Unresulted Labs Ordered in the Past 30 Days of this Admission       Date and Time Order Name Status Description    1/2/2025  3:20 PM Tissue Aerobic Bacterial Culture Routine Preliminary     1/2/2025  3:20 PM Tissue Aerobic Bacterial Culture Routine Preliminary     1/2/2025  3:20 PM Anaerobic  Medication refill requested. Please authorize medication if appropriate.      Bacterial Culture Routine Preliminary     1/2/2025  3:20 PM Anaerobic Bacterial Culture Routine Preliminary     1/1/2025  7:55 PM Blood Culture Peripheral Blood Preliminary     1/1/2025  7:55 PM Blood Culture Peripheral Blood Preliminary              Discharge Disposition   Discharged to home  Condition at discharge: Stable    Hospital Course     Benny Fofana is a 28 year old male admitted on 1/1/2025 with left nondominant hand flexor tenosynovitis after a cat bite, he underwent washout 1/2/25 with orthopedics and will need IV antibiotics on discharge. Hospital course by daniellem below.     Left hand cat bite  Flexor tenosynovitis of the second and third digits of the left hand s/p washout 1/2/25  Leukocytosis, improving  Patient is right-hand dominant and was bit on his left hand on 12/30/2024. He has had progressive erythema and swelling since that point.  At time of presentation he is unable to passively or actively flex his left second and third digit.  His first and second finger are sausage fingers with tenderness of the volar aspect and numbness heading up his wrist. WBC 18.7-16.8-15.0. LA WNL on admission. -82. Underwent left index finger flexor tendon sheath irritation, release, and irrigation, debridement of 2nd webspace  and extensor tendon 1/2/25. EBL 5mL. Surgeon Dr. Ryanne Ruiz. GETA + Local. MRSA swab neg. Inflammatory markers and pain now improving. Discharging home on 1/5 with plan for outpatient IV antibiotics given tenosynovitis.   - Inpatient status  - Levofloxacin and metronidazole started on admission given penicillin allergy --> changed to Ertapenem by ID 1/2/25  - Consult to orthopedic hand surgery,              -s/p OR for washout 01/02/25, see details above  -pain control and work restrictions per orthopedics on discharge  -bowel regiment while requiring opioid pain medication   - Infectious disease consulted,              -Changed to Ertapenem as above  -Will need midline  line placement and 3 weeks of IV abx on discharge, I placed vascular access consult for midline placement on 1/5, patient will tentatively need 21 days of antibiotics total, patient can discharge evening 1/5 after PM dose of ertapenem   -Care Coordinator assisting with arranging home IV therapy, explained rationale for IV antibiotics with patient given severity of infection. He is agreeable pay out of pocket for home infusions. He will follow up with ID and orthopedics before completing antibiotic course. Home infusion will start tomorrow 1/6.  -- Follow up OR Cultures  -- Financial counselor involved as patient does not have insurance  -- Patient is a drywall supervisor by [employer], will need restrictions on going back to work RE lifting and keeping area clean, works in construction environment     Mild Creatinine elevation, resolved  Cr 1.02-1.23-1.24-1.06  -Encourage PO intake  -Avoid further NSAIDs  -Avoid Nephrotoxins  -Can consider repeat at PCP follow up      Hypertension  - Hold PTA telmisartan and preoperative period, has been stable without  -- Held on discharge given normotension, follow up with PCP to consider resuming if appropriate      Tobacco use disorder  Vapes.   -Encourage cessation       Consultations This Hospital Stay   ORTHOPEDIC SURGERY IP CONSULT  INFECTIOUS DISEASES IP CONSULT  CARE MANAGEMENT / SOCIAL WORK IP CONSULT  VASCULAR ACCESS ADULT IP CONSULT  VASCULAR ACCESS ADULT IP CONSULT    Code Status   Full Code    Time Spent on this Encounter   I, Germain Villareal DO, personally saw the patient today and spent greater than 30 minutes discharging this patient.       Germain Villareal DO  [facility]  [address] [place] [id number]  Phone: [phone]  Fax: [phone]  ______________________________________________________________________    Physical Exam   Vital Signs: Temp: 97.9  F (36.6  C) Temp src: Oral BP: 119/71 Pulse: 65   Resp: 16 SpO2: 96  % O2 Device: None (Room air)    Weight: 225 lbs 0 oz    Constitutional: awake, alert, cooperative, no apparent distress, and appears stated age  Eyes: Lids and lashes normal, pupils equal   ENT: Normocephalic, without obvious abnormality, atraumatic   Respiratory: No increased work of breathing, good air exchange, clear to auscultation bilaterally, no crackles or wheezing  Cardiovascular: Normal apical impulse, regular rate and rhythm, normal S1 and S2, no S3 or S4, and no murmur noted  GI: No scars, normal bowel sounds, soft, non-distended, non-tender, no masses palpated, no hepatosplenomegally  Skin: Left hand in post-operative splint. No erythema or fluctuance extending proximally from left arm splint.   Musculoskeletal: No obvious deformity besides left hand splint  Neurologic: Awake, alert, oriented to name, place and time.  Cranial nerves II-XII are grossly intact.   Neuropsychiatric: General: normal, calm, and normal eye contact       Primary Care Physician   Physician No Ref-Primary    Discharge Orders      Hospital to Primary Care - Establish PCP Referral      Home Infusion Referral      Reason for your hospital stay    - Discharging with home infusion antibiotics. You will need 21 days of IV antibiotics   - Follow up with orthopedics and ID team on discharge  - Pain medication and bowel regiment per orthopedic team on discharge   - Work restrictions per orthopedics on discharge     Activity    Your activity upon discharge: activity as tolerated. Hand restrictions per ortho team     Reason for your hospital stay    You were diagnosed with left hand cellulitis with index finger flexor tenosynovitis following a cat bite.  You underwent left hand I&D with Dr. Ruiz on 1/2/25.     Activity    Weight bearing as tolerated and range of motion as tolerated of your operative extremity     Follow Up    Please call on Monday, January 6th to schedule a follow up appointment with Dr. Ryanne Ruiz/Quiana Arellano PA-C  ([facility] [place] [facility] [place]) at 1 week postop for wound care and reevaluation. Please call Dr. Ruiz's care coordinator, Heather, [phone] to set up the appointment or if any questions or concerns arise regarding your orthopedic injury/surgery. No tests/imaging are needed prior to this appointment.     If any questions arise after regular business hours or on weekends, please call the on-call orthopedic provider at [phone].     Wound care and dressings    Instructions to care for your wound at home: Complete daily wound care at home. Start warm water soaks with Chlorhexidine soap mixture, soak for 15-20 min. Pat softly to dry. Change dressing with non-stick dressing, gauze and coban. Call surgeon's office with any questions about this.     Activity    No lifting greater than weight of full coffee cup     Diet    Follow this diet upon discharge: Current Diet:Orders Placed This Encounter      Advance Diet as Tolerated: Regular Diet Adult     Diet    Follow this diet upon discharge: Regular       Significant Results and Procedures   Most Recent 3 CBC's:  Recent Labs   Lab Test 01/04/25  0707 01/03/25  0723 01/02/25  0652 01/01/25  2048   WBC 9.3 15.0* 16.8* 18.7*   HGB  --  13.9 16.5 17.2   MCV  --  89 91 88   PLT  --  298 368 368     Most Recent 3 BMP's:  Recent Labs   Lab Test 01/04/25  0707 01/03/25  0723 01/02/25  0652    137 139   POTASSIUM 4.6 4.2 4.3   CHLORIDE 104 102 99   CO2 28 26 26   BUN 11.1 17.6 16.6   CR 1.06 1.24* 1.23*   ANIONGAP 7 9 14   RAYN 8.3* 8.1* 9.3   GLC 90 112* 95     Most Recent 2 LFT's:  Recent Labs   Lab Test 05/18/24  0347   AST 32   ALT 47   ALKPHOS 71   BILITOTAL 0.5   ,   Results for orders placed or performed during the hospital encounter of 01/01/25   XR Hand Left G/E 3 Views    Narrative    XR HAND LEFT G/E 3 VIEWS 1/2/2025 9:04 AM    HISTORY: Left hand infection s/p cat bite, radiographs for surgical  planning    COMPARISON: None.      Impression     IMPRESSION: No fracture or foreign body. No findings to suggest  osteomyelitis.    NOÉ BABCOCK MD         SYSTEM ID:  [id number]       Discharge Medications   Current Discharge Medication List        START taking these medications    Details   acetaminophen (TYLENOL) 500 MG tablet Take 2 tablets (1,000 mg) by mouth 3 times daily as needed for mild pain.  Qty: 60 tablet, Refills: 0    Associated Diagnoses: Flexor tenosynovitis of finger      chlorhexidine (HIBICLENS) 4 % solution Apply topically daily for 7 days. Apply to warm water and soak the hand daily for 15 minutes x 7 days.  Qty: 946 mL, Refills: 1    Associated Diagnoses: Flexor tenosynovitis of finger      ertapenem (INVANZ) 1 GM vial Inject 1 g over 30 minutes into the vein every 24 hours for 20 days. Check CBC/diff, creat, AST, sed rate and CRP weekly and fax results to [facility]    Associated Diagnoses: Flexor tenosynovitis of finger      ibuprofen (ADVIL/MOTRIN) 600 MG tablet Take 1 tablet (600 mg) by mouth every 8 hours as needed for moderate pain.  Qty: 60 tablet, Refills: 0    Associated Diagnoses: Flexor tenosynovitis of finger      oxyCODONE (ROXICODONE) 10 MG tablet Take 1 tablet (10 mg) by mouth every 4 hours as needed for severe pain (IF pain not managed with non-pharmacological and non-opioid interventions).  Qty: 8 tablet, Refills: 0    Associated Diagnoses: Flexor tenosynovitis of finger           CONTINUE these medications which have NOT CHANGED    Details   aspirin 81 MG EC tablet Take 1 tablet by mouth daily           STOP taking these medications       telmisartan (MICARDIS) 80 MG tablet Comments:   Reason for Stopping:             Allergies   Allergies   Allergen Reactions    Azithromycin Hives